# Patient Record
Sex: FEMALE | Race: WHITE | URBAN - METROPOLITAN AREA
[De-identification: names, ages, dates, MRNs, and addresses within clinical notes are randomized per-mention and may not be internally consistent; named-entity substitution may affect disease eponyms.]

---

## 2018-03-24 ENCOUNTER — HOSPITAL ENCOUNTER (INPATIENT)
Facility: CLINIC | Age: 63
LOS: 13 days | Discharge: ANOTHER HOSPITAL | End: 2018-04-06
Attending: PSYCHIATRY & NEUROLOGY | Admitting: PSYCHIATRY & NEUROLOGY
Payer: COMMERCIAL

## 2018-03-24 DIAGNOSIS — F33.3 SEVERE RECURRENT MAJOR DEPRESSION WITH PSYCHOTIC FEATURES (H): Primary | ICD-10-CM

## 2018-03-24 DIAGNOSIS — E55.9 VITAMIN D DEFICIENCY: ICD-10-CM
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
## 2018-03-24 LAB
BASOPHILS # BLD AUTO: 0 10E9/L (ref 0–0.2)
BASOPHILS NFR BLD AUTO: 0.2 %
DIFFERENTIAL METHOD BLD: ABNORMAL
EOSINOPHIL # BLD AUTO: 0 10E9/L (ref 0–0.7)
EOSINOPHIL NFR BLD AUTO: 0.1 %
ERYTHROCYTE [DISTWIDTH] IN BLOOD BY AUTOMATED COUNT: 14.5 % (ref 10–15)
HCT VFR BLD AUTO: 36.8 % (ref 35–47)
HGB BLD-MCNC: 11.7 G/DL (ref 11.7–15.7)
IMM GRANULOCYTES # BLD: 0.1 10E9/L (ref 0–0.4)
IMM GRANULOCYTES NFR BLD: 0.6 %
LYMPHOCYTES # BLD AUTO: 1 10E9/L (ref 0.8–5.3)
LYMPHOCYTES NFR BLD AUTO: 10.8 %
MCH RBC QN AUTO: 31.9 PG (ref 26.5–33)
MCHC RBC AUTO-ENTMCNC: 31.8 G/DL (ref 31.5–36.5)
MCV RBC AUTO: 100 FL (ref 78–100)
MONOCYTES # BLD AUTO: 0.4 10E9/L (ref 0–1.3)
MONOCYTES NFR BLD AUTO: 4 %
NEUTROPHILS # BLD AUTO: 7.6 10E9/L (ref 1.6–8.3)
NEUTROPHILS NFR BLD AUTO: 84.3 %
NRBC # BLD AUTO: 0 10*3/UL
NRBC BLD AUTO-RTO: 0 /100
PLATELET # BLD AUTO: 278 10E9/L (ref 150–450)
RBC # BLD AUTO: 3.67 10E12/L (ref 3.8–5.2)
WBC # BLD AUTO: 9 10E9/L (ref 4–11)

## 2018-03-24 PROCEDURE — 36415 COLL VENOUS BLD VENIPUNCTURE: CPT | Performed by: PSYCHIATRY & NEUROLOGY

## 2018-03-24 PROCEDURE — 25000132 ZZH RX MED GY IP 250 OP 250 PS 637: Performed by: PSYCHIATRY & NEUROLOGY

## 2018-03-24 PROCEDURE — 85025 COMPLETE CBC W/AUTO DIFF WBC: CPT | Performed by: PSYCHIATRY & NEUROLOGY

## 2018-03-24 PROCEDURE — 12400007 ZZH R&B MH INTERMEDIATE UMMC

## 2018-03-24 RX ORDER — GABAPENTIN 300 MG/1
300 CAPSULE ORAL 2 TIMES DAILY
Status: DISCONTINUED | OUTPATIENT
Start: 2018-03-24 | End: 2018-04-06 | Stop reason: HOSPADM

## 2018-03-24 RX ORDER — CLOZAPINE 25 MG/1
25 TABLET ORAL AT BEDTIME
Status: DISCONTINUED | OUTPATIENT
Start: 2018-03-24 | End: 2018-03-26

## 2018-03-24 RX ORDER — TRAZODONE HYDROCHLORIDE 100 MG/1
100 TABLET ORAL AT BEDTIME
Status: DISCONTINUED | OUTPATIENT
Start: 2018-03-24 | End: 2018-04-06 | Stop reason: HOSPADM

## 2018-03-24 RX ADMIN — GABAPENTIN 300 MG: 300 CAPSULE ORAL at 20:21

## 2018-03-24 RX ADMIN — TRAZODONE HYDROCHLORIDE 100 MG: 100 TABLET ORAL at 20:21

## 2018-03-24 RX ADMIN — CLOZAPINE 25 MG: 25 TABLET ORAL at 20:21

## 2018-03-24 ASSESSMENT — ACTIVITIES OF DAILY LIVING (ADL)
GROOMING: INDEPENDENT
WHICH_OF_THE_ABOVE_FUNCTIONAL_RISKS_HAD_A_RECENT_ONSET_OR_CHANGE?: COGNITION
ORAL_HYGIENE: INDEPENDENT
RETIRED_EATING: 0-->INDEPENDENT
COGNITION: 1 - ATTENTION OR MEMORY DEFICITS
DRESS: INDEPENDENT;SCRUBS (BEHAVIORAL HEALTH)
RETIRED_COMMUNICATION: 2-->DIFFICULTY UNDERSTANDING (NOT RELATED TO LANGUAGE BARRIER)
ORAL_HYGIENE: INDEPENDENT
DRESS: INDEPENDENT
GROOMING: INDEPENDENT
DRESS: 0-->INDEPENDENT
LAUNDRY: UNABLE TO COMPLETE
BATHING: 0-->INDEPENDENT
TOILETING: 0-->INDEPENDENT
SWALLOWING: 0-->SWALLOWS FOODS/LIQUIDS WITHOUT DIFFICULTY
FALL_HISTORY_WITHIN_LAST_SIX_MONTHS: NO
AMBULATION: 0-->INDEPENDENT
TRANSFERRING: 0-->INDEPENDENT

## 2018-03-24 NOTE — PROGRESS NOTES
03/24/18 1139   Patient Belongings   Did you bring any home meds/supplements to the hospital?  No   Patient Belongings money (see comment);clothing;glasses;shoes;purse   Disposition of Belongings Kept with patient;Locker;Sent to security per site process   Belongings Search Yes   Clothing Search Yes   Second Staff Danna SANTAMARIA     Kept in pt locker: black coat, glasses, black flat shoes, black socks, black sweat pants with strings, white bra, blue t-shirt, 1 pair of underwear, black purse with misc receipts and papers, plastic bag filled with coins    Sent to security: state ID, insurance and social security card      A               Admission:  I am responsible for any personal items that are not sent to the safe or pharmacy.  [facility] is not responsible for loss, theft or damage of any property in my possession.    Signature:  _________________________________ Date: _______  Time: _____                                              Staff Signature:  ____________________________ Date: ________  Time: _____      2nd Staff person, if patient is unable/unwilling to sign:    Signature: ________________________________ Date: ________  Time: _____     Discharge:  [facility] has returned all of my personal belongings:    Signature: _________________________________ Date: ________  Time: _____                                          Staff Signature:  ____________________________ Date: ________  Time: _____

## 2018-03-24 NOTE — PLAN OF CARE
Problem: Suicidal Behavior (Adult)  Goal: Suicidal Behavior is Absent/Minimized/Managed  Pt will be absent for suicidal thoughts within 48 hours  Pt will be absent of suicidal gestures within 48 hours  Pt will be absent of auditory hallucinations by discharge  Pt will report an increase in mood  Pt will attend minimum of 3 groups daily by discharge  Pt will be compliant with medications within 48 hours.   Pt will be cooperative with consuming an adequate diet within 48 hours.   Pt admitted on 72 hour hold from [facility] after taking an overdose of 30 tablets of trazodone 100 mg in attempt to kill herself. She reports that she is having command hallucinations telling her ways to kill herself. She states that the AH and SI is constant and will continue to try to kill herself. She has a plan to suffocate herself. She states she cannot control the urges and cannot remain safe on unit. She states she wants to leave so she can try again. She does not have hope for the future. She states she had attempted at least 2x in last month. Hx of  IP sta 2014. She reports she has been unable to sleep.     Pt was discharged from [facility] after 72 hour hold  and she overdosed the next day. She appears blunted, fearful, vunerable, depressed, hopeless. Her concentration is distractible and obvious memory impairment. She states she is confused and having difficulty understanding questions. She is pleasant and cooperative.     She lives with her brother and caregiver, RajatJOYCELYN signed.     SIO within 5 feet at all times with suicide and elopement precautions. Pt states she has attempt to leave a facility in the past and does not want to be IP, because it prevents her from attempting suicide again.

## 2018-03-25 LAB
DEPRECATED CALCIDIOL+CALCIFEROL SERPL-MC: 30 UG/L (ref 20–75)
FOLATE SERPL-MCNC: 10.7 NG/ML
GLUCOSE BLDC GLUCOMTR-MCNC: 103 MG/DL (ref 70–99)
VIT B12 SERPL-MCNC: 669 PG/ML (ref 193–986)

## 2018-03-25 PROCEDURE — 82607 VITAMIN B-12: CPT | Performed by: PSYCHIATRY & NEUROLOGY

## 2018-03-25 PROCEDURE — 12400007 ZZH R&B MH INTERMEDIATE UMMC

## 2018-03-25 PROCEDURE — 82306 VITAMIN D 25 HYDROXY: CPT | Performed by: PSYCHIATRY & NEUROLOGY

## 2018-03-25 PROCEDURE — 25000132 ZZH RX MED GY IP 250 OP 250 PS 637: Performed by: PSYCHIATRY & NEUROLOGY

## 2018-03-25 PROCEDURE — 36415 COLL VENOUS BLD VENIPUNCTURE: CPT | Performed by: PSYCHIATRY & NEUROLOGY

## 2018-03-25 PROCEDURE — 99207 ZZC CDG-UP CODE -MED NECESSITY: CPT | Performed by: PHYSICIAN ASSISTANT

## 2018-03-25 PROCEDURE — 82746 ASSAY OF FOLIC ACID SERUM: CPT | Performed by: PSYCHIATRY & NEUROLOGY

## 2018-03-25 PROCEDURE — 00000146 ZZHCL STATISTIC GLUCOSE BY METER IP

## 2018-03-25 RX ORDER — HYDROXYZINE HYDROCHLORIDE 25 MG/1
50 TABLET, FILM COATED ORAL EVERY 6 HOURS PRN
Status: DISCONTINUED | OUTPATIENT
Start: 2018-03-25 | End: 2018-04-06 | Stop reason: HOSPADM

## 2018-03-25 RX ORDER — ALUMINA, MAGNESIA, AND SIMETHICONE 2400; 2400; 240 MG/30ML; MG/30ML; MG/30ML
30 SUSPENSION ORAL EVERY 4 HOURS PRN
Status: DISCONTINUED | OUTPATIENT
Start: 2018-03-25 | End: 2018-04-06 | Stop reason: HOSPADM

## 2018-03-25 RX ORDER — ACETAMINOPHEN 325 MG/1
650 TABLET ORAL EVERY 4 HOURS PRN
Status: DISCONTINUED | OUTPATIENT
Start: 2018-03-25 | End: 2018-04-06 | Stop reason: HOSPADM

## 2018-03-25 RX ORDER — BISACODYL 10 MG
10 SUPPOSITORY, RECTAL RECTAL DAILY PRN
Status: DISCONTINUED | OUTPATIENT
Start: 2018-03-25 | End: 2018-04-06 | Stop reason: HOSPADM

## 2018-03-25 RX ORDER — TRAZODONE HYDROCHLORIDE 50 MG/1
50 TABLET, FILM COATED ORAL
Status: DISCONTINUED | OUTPATIENT
Start: 2018-03-25 | End: 2018-04-06 | Stop reason: HOSPADM

## 2018-03-25 RX ORDER — LITHIUM CARBONATE 300 MG/1
300 TABLET, FILM COATED, EXTENDED RELEASE ORAL DAILY
Status: DISCONTINUED | OUTPATIENT
Start: 2018-03-25 | End: 2018-03-28

## 2018-03-25 RX ADMIN — CLOZAPINE 25 MG: 25 TABLET ORAL at 20:24

## 2018-03-25 RX ADMIN — GABAPENTIN 300 MG: 300 CAPSULE ORAL at 08:39

## 2018-03-25 RX ADMIN — GABAPENTIN 300 MG: 300 CAPSULE ORAL at 20:24

## 2018-03-25 RX ADMIN — LITHIUM CARBONATE 300 MG: 300 TABLET, EXTENDED RELEASE ORAL at 14:15

## 2018-03-25 RX ADMIN — TRAZODONE HYDROCHLORIDE 100 MG: 100 TABLET ORAL at 20:24

## 2018-03-25 ASSESSMENT — ACTIVITIES OF DAILY LIVING (ADL)
GROOMING: INDEPENDENT
ORAL_HYGIENE: INDEPENDENT
DRESS: INDEPENDENT;SCRUBS (BEHAVIORAL HEALTH)
ORAL_HYGIENE: INDEPENDENT
LAUNDRY: UNABLE TO COMPLETE
GROOMING: INDEPENDENT
DRESS: INDEPENDENT

## 2018-03-25 NOTE — PROGRESS NOTES
Initial Psychosocial Assessment    I have reviewed the chart, met with the patient, and developed Care Plan.  Information for assessment was obtained from: Patient and Medical Chart    Presenting Problem:  Patient was admitted on a 72 hour hold to [facility] from [facility] on 3/24/18 due to Command Audio hallucinations telling her to kill herself. She had taken a bottle of trazodone in an overdose attempt in order to die prior to going to [place].       History of Mental Health and Chemical Dependency:  Multiple past Hospitalizations (4-5).  Known to this facility.  Was here in 2014 and prescribed lithium but stopped because she didn't like blood draws.  Hx of several different medication trials.  Hx of SA. Hx of commitment ([facility] )    Substance use does not appear to be contributing to current presentation.      Family Description ([facility], Family Psychiatric History):  Patient was born/raised in [place].  Patient has 1 brother aged 64, and mother is . Father  of prostate cancer.  Patient  is never , no children.  Patient denies any any family history of mental illness or substance abuse/dependence.    Significant Life Events (Illness, Abuse, Trauma, Death):  Mother and brother cared for the patient nearly 20 years. Mother is now .  In 2014, mother and brother were caregivers and reported to [facility] feeling that her SI was a test by God and that they were attempting to heal her through prayer. They feel that now the devil is in control of her.     Living Situation:  She is her own legal decision maker, however, she lives with her brother (Óscar Huntley [phone]) in [facility]) - he basically takes care of and makes decisions for her.     Educational Background:   Patient is a [email or web address] graduate.  She completed 2 years of college at [employer] in [place].     Occupational History:   Patient is unemployed.    Financial Status:  Insurance is provided by Medicaid and  [employer] [place]    Legal Issues:  72 Hour Hold Begin Date: 3/24/2018    72 Hour Hold Begin Time: 9:33 AM    72 Hour Hold End Date: 3/29/2018    72 Hour Hold Time End: 12:10 AM      Ethnic/Cultural Issues:  62 year old female, single, no children    Spiritual Orientation:      Patient was raised Christianity but family left the Mor[employer].  Patient has reported that she worships with her brother at home.        Service History:   N/A     Social Functioning (organization, interests):  Patient is very isolated. She reports that she  has left her home rarely for many years    Current Treatment Providers are:  None reported    Social Service Assessment/Plan:  Patient will have psychiatric assessment and medication management by the psychiatrist. Medications will be reviewed and adjusted per MD as indicated. The treatment team will continue to assess and stabilize the patient's mental health symptoms with the use of medications and therapeutic programming. Hospital staff will provide a safe environment and a therapeutic milieu. Staff will continue to assess patient as needed. Patient will participate in unit groups and activities. Patient will receive individual and group support on the unit.     CTC will do individual inpatient treatment planning and after care planning. CTC will discuss options for increasing community supports with the patient. CTC will coordinate with outpatient providers and will place referrals to ensure appropriate follow up care is in place.

## 2018-03-25 NOTE — PROGRESS NOTES
Patient seen, chart reviewed, care discussed with staff.  Blood pressure 139/78, pulse 99, temperature 98.7  F (37.1  C), temperature source Tympanic, resp. rate 16, weight 64.5 kg (142 lb 1.6 oz), SpO2 94 %.    Alert.  Affect fair.  Speech slow with latency.  Eye contact good.  Psychomotor behavior slow.    Associations intact.  Still wanting to suicide and die    Plan:Clozaril start tonite  2.  Resume [place]      Current Facility-Administered Medications:      traZODone (DESYREL) tablet 50 mg, 50 mg, Oral, At Bedtime PRN, Demarco Ventura MD     magnesium hydroxide (MILK OF MAGNESIA) suspension 30 mL, 30 mL, Oral, Daily PRN, Demarco Ventura MD     bisacodyl (DULCOLAX) Suppository 10 mg, 10 mg, Rectal, Daily PRN, Demarco Ventura MD     hydrOXYzine (ATARAX) tablet 50 mg, 50 mg, Oral, Q6H PRN, Demarco Ventura MD     alum & mag hydroxide-simethicone (MYLANTA ES/MAALOX  ES) suspension 30 mL, 30 mL, Oral, Q4H PRN, Demarco Ventura MD     acetaminophen (TYLENOL) tablet 650 mg, 650 mg, Oral, Q4H PRN, Demarco Ventura MD     lithium (ESKALITH/LITHOBID) CR tablet 300 mg, 300 mg, Oral, Daily, Demarco Ventura MD     gabapentin (NEURONTIN) capsule 300 mg, 300 mg, Oral, BID, Demarco Ventura MD, 300 mg at 03/25/18 0839     traZODone (DESYREL) tablet 100 mg, 100 mg, Oral, At Bedtime, Demacro Ventura MD, 100 mg at 03/24/18 2021     cloZAPine (CLOZARIL) tablet 25 mg, 25 mg, Oral, At Bedtime, Demarco Ventura MD, 25 mg at 03/24/18 2021

## 2018-03-25 NOTE — PHARMACY
Pharmacy Clozapine Initial Note    Date of Service: 3/25/2018  Patient's : 1955  62 year old, female    Recent ANC Value(s):  Recent Labs   Lab Test  18   1339   ANEU  7.6       Is the patient enrolled in the clozapine REMS program? Yes  Ordering prescriber: KAT Ndiaye  Is this provider certified in the clozapine REMS program? Yes  Is the ANC within recommended limits? Yes  Does the patient have any signs or symptoms of infection, including fever or sore throat? Unknown    Plan:  1. Initiate clozapine therapy at 25 mg PO.  2. A WBC with differential will be ordered at least weekly.  ANC values will be entered into the REMS program.  3. Signs/symptoms of infection will be monitored daily.    Alexandrea Turner [facility]  Phone / Pager: [phone]

## 2018-03-25 NOTE — PROGRESS NOTES
Refused dinner but with encouragement did drink 240 vanilla Boost. Isolative to her bed. Affect flat, blunted, depressed. States she has been depressed all her life. States she has had ECT in the past without success. Has suicide ideation to stop eating or suffocate herself.  Was concerned that she had not voided since this afternoon. Bladder scan done at 2000 = 0. Has had little fluid intake. With encouragement did drink 200 cc with hs meds.

## 2018-03-25 NOTE — PROGRESS NOTES
"   03/25/18 1417   Behavioral Health   Hallucinations auditory   Thinking distractable;poor concentration   Orientation person: oriented;place: oriented   Memory baseline memory   Insight poor   Judgement impaired   Eye Contact at examiner   Affect sad;tense;blunted, flat   Mood anxious;depressed;mood is calm   Physical Appearance/Attire attire appropriate to age and situation   Hygiene well groomed  (pt showered on shift)   Suicidality thoughts only   1. Wish to be Dead Yes   2. Non-Specific Active Suicidal Thoughts  Yes   Elopement Statements about wanting to leave   Activity withdrawn;isolative   Speech coherent  (slow to respond)   Medication Sensitivity no stated side effects;no observed side effects   Psychomotor / Gait slow;steady   Activities of Daily Living   Hygiene/Grooming independent   Oral Hygiene independent   Dress independent   Laundry unable to complete   Room Organization independent   Behavioral Health Interventions   Depression maintain safety precautions;maintain safe secure environment;assist patient in developing safety plan;assist patient in following safety plan;encourage participation / independence with adls;encourage nutrition and hydration;provide emotional support;establish therapeutic relationship;assist with developing and utilizing healthy coping strategies;build upon strengths   Social and Therapeutic Interventions (Depression) encourage socialization with peers;encourage effective boundaries with peers;encourage participation in therapeutic groups and milieu activities     Pt states she has active suicidal thoughts and wishing she were dead, but does not have an active plan while in the hospital. She reports that she has auditory hallucinations that have been telling her to kill herself. She has been isolative and withdrawn during majority of the day shift. She did not sleep last night and has been unable to sleep during day, so she is hopeful \"that I will get good sleep tonight.\" " "Pt took a shower on day shift, which was one of her goals for the day. She rates her anxiety at a 7/10 and her depression also at a 7/10. She is \"hopeful that I will sleep tonight. My mood is a lot better today, I am more hopeful.\" Pt has been very pleasant and cooperative.    "

## 2018-03-25 NOTE — CONSULTS
Internal Medicine Initial Visit    Elma Huntley MRN# [id number]   Age: 62 year old YOB: 1955   Date of Admission: 3/24/2018    ADMIT DATE: 3/24/2018  DATE OF CONSULT: 3/25/2018    PCP: No primary care provider on file.    REQUESTING SERVICE: Psychiatry  REASON FOR CONSULT: Depression, SI    CHIEF COMPLAINT: Depression and suicidal ideation    HPI: Elma Huntley is a 62 year old female with a past medical history of schizophrenia, anxiety, chronic suicidal ideation who is admitted to station 3B for suicidal ideation.    The patient notes that she is still depressed with feelings of suicidal ideation. The patient notes that she has no medical issues. She notes she feels okay w/ exception to depression. Lives with her brother who is supportive. She denies any pain. Notes she is drinking water and has no urinary issues currently.     ROS:   10 point ROS asked and otherwise negative, with exceptions as noted above in HPI.     PMH:  No past medical history on file.    PSH:  No past surgical history on file.    MEDICATIONS    No current facility-administered medications on file prior to encounter.   No current outpatient prescriptions on file prior to encounter.    ALLERGIES:   No Known Allergies    FAMILY HISTORY:  Reviewed and updated in [facility].     SOCIAL HISTORY:  Social History     Social History     Marital status: Single     Spouse name: N/A     Number of children: N/A     Years of education: N/A     Social History Main Topics     Smoking status: Not on file     Smokeless tobacco: Not on file     Alcohol use Not on file     Drug use: Not on file     Sexual activity: Not on file     Other Topics Concern     Not on file     Social History Narrative       PHYSICAL EXAM:  Blood pressure 136/77, pulse 113, temperature 98  F (36.7  C), temperature source Tympanic, resp. rate 16, SpO2 94 %.    GENERAL: Alert and orientated x 3. Appears in no acute distress.   HEENT: Anicteric sclera. Mucous membranes moist and  without lesions.   CV: RRR, S1S2, no murmurs appreciated.  RESPIRATORY: Respirations regular, even, and unlabored. Lungs CTAB with no wheezing, rales, rhonchi.   GI: Soft and non distended with normoactive bowel sounds present in all quadrants. No tenderness, rebound, guarding.   EXTREMITIES: No peripheral edema.   NEUROLOGIC: CN II-XII grossly intact. No focal deficits.   MUSCULOSKELETAL: No joint swelling or tenderness.   SKIN: No jaundice. No rashes or lesions.     LABS:  CMPNo lab results found in last 7 days.  CBC  Recent Labs  Lab 03/24/18  1339   WBC 9.0   RBC 3.67*   HGB 11.7   HCT 36.8      MCH 31.9   MCHC 31.8   RDW 14.5        INRNo lab results found in last 7 days.    IMAGING: None to review from this admission    ASSESSMENT & RECOMMENDATIONS:  #Depression, SI, schizophrenia: Longstanding h/o depression w/ SI. CBC WNL. VS w/ normal BP, mildly elevated HR, afebrile, RR normal, O2 saturations 94% on arrival. CMP from OSH 03/17/2018 normal w/ exception to elevated BG. MRI from OSH 03/2018 w/ no focal abnormality.   -Management per psychiatry  #Elevated BG: No h/o diabetes.  at OSH on 03/17 CMP.   -Monitor BG  -Notify medicine if BG >180 persistently    I appreciate the opportunity to participate in the care of this patient. Medicine will sign off, please notify on call DOUGLAS if any intercurrent medical issues arise.     Tamika Winters PA-C

## 2018-03-26 LAB
GLUCOSE BLDC GLUCOMTR-MCNC: 119 MG/DL (ref 70–99)
GLUCOSE BLDC GLUCOMTR-MCNC: 91 MG/DL (ref 70–99)

## 2018-03-26 PROCEDURE — 25000132 ZZH RX MED GY IP 250 OP 250 PS 637: Performed by: PSYCHIATRY & NEUROLOGY

## 2018-03-26 PROCEDURE — 00000146 ZZHCL STATISTIC GLUCOSE BY METER IP

## 2018-03-26 PROCEDURE — 12400005 ZZH R&B MH CRITICAL SENIOR/ADOLESCENT

## 2018-03-26 RX ORDER — CLOZAPINE 25 MG/1
50 TABLET ORAL AT BEDTIME
Status: DISCONTINUED | OUTPATIENT
Start: 2018-03-26 | End: 2018-03-28

## 2018-03-26 RX ADMIN — VITAMIN D, TAB 1000IU (100/BT) 2000 UNITS: 25 TAB at 16:18

## 2018-03-26 RX ADMIN — CLOZAPINE 50 MG: 25 TABLET ORAL at 19:38

## 2018-03-26 RX ADMIN — LITHIUM CARBONATE 300 MG: 300 TABLET, EXTENDED RELEASE ORAL at 08:23

## 2018-03-26 RX ADMIN — GABAPENTIN 300 MG: 300 CAPSULE ORAL at 19:39

## 2018-03-26 RX ADMIN — HYDROXYZINE HYDROCHLORIDE 50 MG: 25 TABLET ORAL at 11:02

## 2018-03-26 RX ADMIN — TRAZODONE HYDROCHLORIDE 100 MG: 100 TABLET ORAL at 19:39

## 2018-03-26 RX ADMIN — GABAPENTIN 300 MG: 300 CAPSULE ORAL at 08:23

## 2018-03-26 ASSESSMENT — ACTIVITIES OF DAILY LIVING (ADL)
ORAL_HYGIENE: INDEPENDENT
GROOMING: INDEPENDENT
ORAL_HYGIENE: INDEPENDENT
DRESS: INDEPENDENT
LAUNDRY: UNABLE TO COMPLETE
GROOMING: INDEPENDENT
DRESS: SCRUBS (BEHAVIORAL HEALTH)

## 2018-03-26 NOTE — PROGRESS NOTES
"Pt was vague with writer about her symptoms.  Does report command hallucinations.  Unable to process ideas about distraction and coping skills.    Accepted PRN Hydroxyzine for anxiety.  Reports \"a little bit better, but still hearing voices.\"    "

## 2018-03-26 NOTE — PROGRESS NOTES
"   03/26/18 1230   Behavioral Health   Hallucinations auditory   Thinking poor concentration   Orientation person: oriented;place: oriented;date: oriented;time: oriented   Memory baseline memory   Insight admits / accepts   Judgement intact   Eye Contact at examiner   Affect blunted, flat;other (see comments)  (at times full range)   Mood mood is calm;depressed;hopeless   Physical Appearance/Attire attire appropriate to age and situation   Hygiene well groomed   Suicidality thoughts only;other (see comments)  (\"the voices are telling me i should kill myself\")   1. Wish to be Dead Yes   2. Non-Specific Active Suicidal Thoughts  Yes  (\"the voices are telling me I should kill myself:)   Self Injury other (see comment)  (denies)   Elopement (none noted this shift)   Activity withdrawn;isolative;other (see comment)  (out for meals in milieu)   Speech clear;coherent   Medication Sensitivity no stated side effects;no observed side effects   Psychomotor / Gait balanced;steady   Safety   Suicidality SIO (Status Individual Observation)  (NOTE - order will specify distance;Minimal furniture in room;Minimal personal belongings in room;Room door open;Unpredictable frequency of checking on patient;Promote patient engagement with treatment process;Identify and strengthen protective factors   Coping/Psychosocial   Verbalized Emotional State hopelessness;depression   Plan Of Care Reviewed With patient   Patient Agreement with Plan of Care agrees   Psycho Education   Type of Intervention 1:1 intervention   Response participates, initiates socially appropriate   Hours 0.5   Treatment Detail (check in)   Group Therapy Session   Group Attendance refused to attend group session   Activities of Daily Living   Hygiene/Grooming independent   Oral Hygiene independent   Dress scrubs (behavioral health)   Laundry unable to complete   Room Organization independent   Activity   Activity Assistance Provided independent   Behavioral Health " "Interventions   Depression maintain safety precautions;maintain safe secure environment;encourage nutrition and hydration;encourage participation / independence with adls;provide emotional support;assist with developing and utilizing healthy coping strategies;establish therapeutic relationship;build upon strengths;monitor need for prn medication;monitor confusion, memory loss, decision making ability and reorient / intervene as needed   Social and Therapeutic Interventions (Depression) encourage socialization with peers;encourage effective boundaries with peers;encourage participation in therapeutic groups and milieu activities     Pt was in room working on word search and pacing most of the shift stating that \"they help the voices sometimes.\" Pt encouraged to attend groups this shift, but pt stated \"the voices are bad today. I am having a really bad day.\" Pt endorses AH that \"tell me I am worthless and that I should kill myself.\" Pt engages in conversation with staff and affect brightens some. Pt affect blunted/flat with calm and depressed mood. Pt out for both meals and eating well. Pt stated getting \"the best sleep I have had in 2 or 3 nights\" last night despite waking \"up early, and couldn't get back to sleep.\" Pt endorses SI, but not SIB. Pt calm, cooperative, and pleasant with check in.  "

## 2018-03-26 NOTE — PROGRESS NOTES
Patient seen, chart reviewed, care discussed with staff.  Blood pressure 126/58, pulse 101, temperature 98.7  F (37.1  C), temperature source Oral, resp. rate 16, weight 64.5 kg (142 lb 1.6 oz), SpO2 94 %.    Alert.  Affect fair, sad.  Speech latency noted.  Eye contact good.  Psychomotor behavior and gait  slow.  Hallucinations present today, less so yesterday  No dizzyness.  Associations intact. Cognitions intact.  Still needs 1:1 for safety.    Plan: increase Clozaril  Lithium level      Current Facility-Administered Medications:      cloZAPine (CLOZARIL) tablet 50 mg, 50 mg, Oral, At Bedtime, Demarco Ventura MD     cholecalciferol (vitamin D3) tablet 2,000 Units, 2,000 Units, Oral, Daily, Demarco Ventura MD     traZODone (DESYREL) tablet 50 mg, 50 mg, Oral, At Bedtime PRN, Demarco Ventura MD     magnesium hydroxide (MILK OF MAGNESIA) suspension 30 mL, 30 mL, Oral, Daily PRN, Demarco Ventura MD     bisacodyl (DULCOLAX) Suppository 10 mg, 10 mg, Rectal, Daily PRN, Demarco Ventura MD     hydrOXYzine (ATARAX) tablet 50 mg, 50 mg, Oral, Q6H PRN, Demarco Ventura MD, 50 mg at 03/26/18 1102     alum & mag hydroxide-simethicone (MYLANTA ES/MAALOX  ES) suspension 30 mL, 30 mL, Oral, Q4H PRN, Demarco Ventura MD     acetaminophen (TYLENOL) tablet 650 mg, 650 mg, Oral, Q4H PRN, Demarco Ventura MD     lithium (ESKALITH/LITHOBID) CR tablet 300 mg, 300 mg, Oral, Daily, Demarco Ventura MD, 300 mg at 03/26/18 0823     gabapentin (NEURONTIN) capsule 300 mg, 300 mg, Oral, BID, Demarco Ventura MD, 300 mg at 03/26/18 0823     traZODone (DESYREL) tablet 100 mg, 100 mg, Oral, At Bedtime, Demarco Ventura MD, 100 mg at 03/25/18 2024  Recent Results (from the past 168 hour(s))   CBC with platelets differential    Collection Time: 03/24/18  1:39 PM   Result Value Ref Range    WBC 9.0 4.0 - 11.0 10e9/L    RBC Count 3.67 (L) 3.8 - 5.2 10e12/L    Hemoglobin 11.7 11.7 - 15.7 g/dL    Hematocrit 36.8 35.0 - 47.0 %     78 - 100 fl    MCH 31.9 26.5 -  33.0 pg    MCHC 31.8 31.5 - 36.5 g/dL    RDW 14.5 10.0 - 15.0 %    Platelet Count 278 150 - 450 10e9/L    Diff Method Automated Method     % Neutrophils 84.3 %    % Lymphocytes 10.8 %    % Monocytes 4.0 %    % Eosinophils 0.1 %    % Basophils 0.2 %    % Immature Granulocytes 0.6 %    Nucleated RBCs 0 0 /100    Absolute Neutrophil 7.6 1.6 - 8.3 10e9/L    Absolute Lymphocytes 1.0 0.8 - 5.3 10e9/L    Absolute Monocytes 0.4 0.0 - 1.3 10e9/L    Absolute Eosinophils 0.0 0.0 - 0.7 10e9/L    Absolute Basophils 0.0 0.0 - 0.2 10e9/L    Abs Immature Granulocytes 0.1 0 - 0.4 10e9/L    Absolute Nucleated RBC 0.0    Vitamin B12    Collection Time: 03/25/18  7:30 AM   Result Value Ref Range    Vitamin B12 669 193 - 986 pg/mL   Folate    Collection Time: 03/25/18  7:30 AM   Result Value Ref Range    Folate 10.7 >5.4 ng/mL   Vitamin D    Collection Time: 03/25/18  7:30 AM   Result Value Ref Range    Vitamin D Deficiency screening 30 20 - 75 ug/L   Glucose by meter    Collection Time: 03/25/18  4:57 PM   Result Value Ref Range    Glucose 103 (H) 70 - 99 mg/dL   Glucose by meter    Collection Time: 03/26/18  7:46 AM   Result Value Ref Range    Glucose 119 (H) 70 - 99 mg/dL

## 2018-03-26 NOTE — PLAN OF CARE
Problem: Patient Care Overview  Goal: Team Discussion  Team Plan:   BEHAVIORAL TEAM DISCUSSION    Participants: Dr Lorenzo MD, Kathleen,RN, Mary Mckee OT and Loida Garcia [facility]  Progress: Initial  Continued Stay Criteria/Rationale: Psychosis  Medical/Physical: Refer to medical  Precautions:   Behavioral Orders   Procedures     Code 1 - Restrict to Unit     Elopement precautions     Routine Programming     As clinically indicated     Status Individual Observation     Order Specific Question:   CONTINUOUS 24 hours / day     Answer:   Distance and Exceptions     Order Specific Question:   Distance     Answer:   5 feet     Order Specific Question:   Exceptions     Answer:   none     Suicide precautions     Patients on Suicide Precautions should have a Combination Diet ordered that includes a Diet selection(s) AND a Behavioral Tray selection for Safe Tray - with utensils, or Safe Tray - NO utensils       Plan: : Psychiatric and Medical Assessment. Medication Management. Therapeutic Milieu. Occupational therapy, mental health education, Individual and group support. Message left for Brother/ caregiver, Rajat to gain collateral. Pt signed in voluntary.  Rationale for change in precautions or plan: NA

## 2018-03-26 NOTE — PROGRESS NOTES
1415: Writer LM for pts brotherRajat [phone] to gain collateral. JOYCELYN in chart.    1450: UPDATE: Rajat called back and gave the following collateral information. Pts Zyprexa was recently decreased and sicne then she has reported voices and being suicidal. At baseline pt does house work, crafts, reads, sleeps about 8-9 hours per night and is not suicidal or talking about voices. Plan will be for pt to discharge back to brother's home once stable. Pt has community MH providers.

## 2018-03-26 NOTE — PLAN OF CARE
Problem: Depressive Symptoms  Goal: Depressive Symptoms  Signs and symptoms of listed problems will be absent or manageable.   Outcome: Improving  Denies auditory command hallucinations to kill herself.. This evening states no longer has plan to kill self ; only fleeting thoughts with no plan. This evening denies wish to be dead. Ate well for dinner. Engaging in 1:1, more animated. Attended and participated in community meeting. Is hopeful.  States she did not sleep well last night. Good eye contact.Plan: Have MD assess tomorrow if she still needs SIO.

## 2018-03-27 LAB
GLUCOSE BLDC GLUCOMTR-MCNC: 112 MG/DL (ref 70–99)
LITHIUM SERPL-SCNC: <0.2 MMOL/L (ref 0.6–1.2)

## 2018-03-27 PROCEDURE — 00000146 ZZHCL STATISTIC GLUCOSE BY METER IP

## 2018-03-27 PROCEDURE — H2032 ACTIVITY THERAPY, PER 15 MIN: HCPCS

## 2018-03-27 PROCEDURE — 36415 COLL VENOUS BLD VENIPUNCTURE: CPT | Performed by: PSYCHIATRY & NEUROLOGY

## 2018-03-27 PROCEDURE — 80178 ASSAY OF LITHIUM: CPT | Performed by: PSYCHIATRY & NEUROLOGY

## 2018-03-27 PROCEDURE — 12400005 ZZH R&B MH CRITICAL SENIOR/ADOLESCENT

## 2018-03-27 PROCEDURE — 97150 GROUP THERAPEUTIC PROCEDURES: CPT | Mod: GO

## 2018-03-27 PROCEDURE — 25000132 ZZH RX MED GY IP 250 OP 250 PS 637: Performed by: PSYCHIATRY & NEUROLOGY

## 2018-03-27 RX ORDER — OLANZAPINE 5 MG/1
10 TABLET, ORALLY DISINTEGRATING ORAL EVERY 6 HOURS PRN
Status: DISCONTINUED | OUTPATIENT
Start: 2018-03-27 | End: 2018-03-29

## 2018-03-27 RX ADMIN — CLOZAPINE 50 MG: 25 TABLET ORAL at 20:24

## 2018-03-27 RX ADMIN — VITAMIN D, TAB 1000IU (100/BT) 2000 UNITS: 25 TAB at 08:08

## 2018-03-27 RX ADMIN — GABAPENTIN 300 MG: 300 CAPSULE ORAL at 20:24

## 2018-03-27 RX ADMIN — GABAPENTIN 300 MG: 300 CAPSULE ORAL at 08:08

## 2018-03-27 RX ADMIN — OLANZAPINE 10 MG: 5 TABLET, ORALLY DISINTEGRATING ORAL at 14:45

## 2018-03-27 RX ADMIN — LITHIUM CARBONATE 300 MG: 300 TABLET, EXTENDED RELEASE ORAL at 08:08

## 2018-03-27 RX ADMIN — TRAZODONE HYDROCHLORIDE 100 MG: 100 TABLET ORAL at 20:24

## 2018-03-27 ASSESSMENT — ACTIVITIES OF DAILY LIVING (ADL)
LAUNDRY: UNABLE TO COMPLETE
GROOMING: INDEPENDENT
DRESS: INDEPENDENT
GROOMING: INDEPENDENT
ORAL_HYGIENE: INDEPENDENT
DRESS: INDEPENDENT
ORAL_HYGIENE: INDEPENDENT

## 2018-03-27 NOTE — PLAN OF CARE
"Problem: Depressive Symptoms  Goal: Depressive Symptoms  Signs and symptoms of listed problems will be absent or manageable.   Absense of SI.  Participate in structures activities.  Eat 50 to 75% of meals.  Sleep 6 to 8 hours at night.     Attended 1 of 2 OT groups. She initiated request to work on putting together a puzzle. Organization was unsuccessful even with cues, though she wanted to continue work on this. At the end of the session she stated this being too difficult and wanting to try a less challenging task for the next day. She was pleasant. Stated reason for admission on the OT goal form as \"suicidal depression\". she chose the goal focus to improve concentration, time management, follow directions better and listen better. Pt was given and completed a written self assessment. OT purpose was explained with the value of having involvement in treatment plan, and provided options to meet self identified goals. Will assess further and set goal focus.        "

## 2018-03-27 NOTE — PROGRESS NOTES
Feeling hopeless. Has auditory hallucination that tell her to kill herself. Has SI but no plan. Med compliant.

## 2018-03-27 NOTE — PLAN OF CARE
"Problem: Suicidal Behavior (Adult)  Goal: Suicidal Behavior is Absent/Minimized/Managed  Pt will be absent for suicidal thoughts within 48 hours  Pt will be absent of suicidal gestures within 48 hours  Pt will be absent of auditory hallucinations by discharge  Pt will report an increase in mood  Pt will attend minimum of 3 groups daily by discharge  Pt will be compliant with medications within 48 hours.   Pt will be cooperative with consuming an adequate diet within 48 hours.    Outcome: No Change  48 Hour Nursing Assessment  Pt states she is having a hard day and that the voices are telling her to kill herself and that it is hopeless. She states she is unable to differentiate the voices as being symptoms of her illness and believes they speak the truth. She describes the voices as constant, even when others are speaking. She states that nothing has changed, but did report she did have suicidal thoughts on Sunday. Her mood is restrictive, blunted, staring, delayed responses. She is unable to give me a mood to describe herself, choosing \"lousy\".   She smiled big upon approach, is pleasant. She states she does not want to go to groups, discussed importance of groups and the use of distraction with the voices. She does state she sees value in ignoring the voices. She appears anxious and pulses are elevated, 104, 117. Encouraging fluids.   She is medication compliant, denies side effects, reports sleep is better, ADLs are appropriate.   She thinks she feels safer with SIO. SIO will need to be continued at this time as pt cannot state she is safe. Will monitor if SIO is creating secondary gains.     Lithium level <0.20 notified Dr Ventura      Increased focus on voices, SI, and hopelessness. Obtained order for zydis 10 mg PO every 6 hours PRN for psychosis, voices. Administered at 1445.   "

## 2018-03-27 NOTE — PROGRESS NOTES
Writer received VM from Sarah Mckee at [facility] regarding pt. [phone]. Sarah kerr she will be provided crisis support to pt upon discharge from hospital. Writer returned call but had to LM.

## 2018-03-28 LAB
GLUCOSE BLDC GLUCOMTR-MCNC: 117 MG/DL (ref 70–99)
GLUCOSE BLDC GLUCOMTR-MCNC: 94 MG/DL (ref 70–99)

## 2018-03-28 PROCEDURE — 25000132 ZZH RX MED GY IP 250 OP 250 PS 637: Performed by: PSYCHIATRY & NEUROLOGY

## 2018-03-28 PROCEDURE — 00000146 ZZHCL STATISTIC GLUCOSE BY METER IP

## 2018-03-28 PROCEDURE — 12400005 ZZH R&B MH CRITICAL SENIOR/ADOLESCENT

## 2018-03-28 PROCEDURE — 97150 GROUP THERAPEUTIC PROCEDURES: CPT | Mod: GO

## 2018-03-28 PROCEDURE — 90853 GROUP PSYCHOTHERAPY: CPT

## 2018-03-28 RX ORDER — LITHIUM CARBONATE 450 MG
450 TABLET, EXTENDED RELEASE ORAL DAILY
Status: DISCONTINUED | OUTPATIENT
Start: 2018-03-29 | End: 2018-04-06 | Stop reason: HOSPADM

## 2018-03-28 RX ORDER — CLOZAPINE 25 MG/1
75 TABLET ORAL AT BEDTIME
Status: DISCONTINUED | OUTPATIENT
Start: 2018-03-28 | End: 2018-03-30

## 2018-03-28 RX ADMIN — TRAZODONE HYDROCHLORIDE 100 MG: 100 TABLET ORAL at 20:28

## 2018-03-28 RX ADMIN — GABAPENTIN 300 MG: 300 CAPSULE ORAL at 08:45

## 2018-03-28 RX ADMIN — CLOZAPINE 75 MG: 25 TABLET ORAL at 20:27

## 2018-03-28 RX ADMIN — OLANZAPINE 10 MG: 5 TABLET, ORALLY DISINTEGRATING ORAL at 05:37

## 2018-03-28 RX ADMIN — GABAPENTIN 300 MG: 300 CAPSULE ORAL at 20:27

## 2018-03-28 RX ADMIN — VITAMIN D, TAB 1000IU (100/BT) 2000 UNITS: 25 TAB at 08:45

## 2018-03-28 RX ADMIN — LITHIUM CARBONATE 300 MG: 300 TABLET, EXTENDED RELEASE ORAL at 08:45

## 2018-03-28 RX ADMIN — OLANZAPINE 10 MG: 5 TABLET, ORALLY DISINTEGRATING ORAL at 13:12

## 2018-03-28 ASSESSMENT — ACTIVITIES OF DAILY LIVING (ADL)
DRESS: SCRUBS (BEHAVIORAL HEALTH)
HYGIENE/GROOMING: INDEPENDENT
ORAL_HYGIENE: INDEPENDENT

## 2018-03-28 NOTE — PROGRESS NOTES
"Pt is pleasant and cooperative, but reports that her day is not going well.  She said that she continues to hear voices and the voices are making her feel \"very suicidal.\"  When asked about if she had any plan due to the SI's, she said \"Maybe I could quit eating while I'm here, and then do something when I get home.\"  She attended groups this morning, but hung out in her room most of the afternoon doing word search puzzles with a sound machine in the background.  She had a PRN med shortly after lunch which she reported was helpful.    "

## 2018-03-28 NOTE — PROGRESS NOTES
"Patient seen, chart reviewed, care discussed with staff.  Blood pressure 126/89, pulse 113, temperature 97.2  F (36.2  C), temperature source Tympanic, resp. rate 16, weight 63.9 kg (140 lb 14.4 oz), SpO2 95 %.    Alert.  Affect fair, has a \"walleyed\" look and stare.  Speech decreased.  Eye contact good.  Psychomotor behavior deliberate and gait  normal.  Voices and psychosis and suicidal thoughts continue.  Associations intact. Cognitions intact superficially.    Plan: increase Clozaril to 75mg, increase Li to 450mg  Li level Friday      Current Facility-Administered Medications:      cloZAPine (CLOZARIL) tablet 75 mg, 75 mg, Oral, At Bedtime, Demarco Ventura MD     [START ON 3/29/2018] lithium (ESKALITH) CR tablet 450 mg, 450 mg, Oral, Daily, Demarco Ventura MD     OLANZapine zydis (zyPREXA) ODT tab 10 mg, 10 mg, Oral, Q6H PRN, Demarco Ventura MD, 10 mg at 03/28/18 0537     cholecalciferol (vitamin D3) tablet 2,000 Units, 2,000 Units, Oral, Daily, Demarco Ventura MD, 2,000 Units at 03/28/18 0845     traZODone (DESYREL) tablet 50 mg, 50 mg, Oral, At Bedtime PRN, Demarco Ventura MD     magnesium hydroxide (MILK OF MAGNESIA) suspension 30 mL, 30 mL, Oral, Daily PRN, Demarco Ventura MD     bisacodyl (DULCOLAX) Suppository 10 mg, 10 mg, Rectal, Daily PRN, Demarco Ventura MD     hydrOXYzine (ATARAX) tablet 50 mg, 50 mg, Oral, Q6H PRN, Demarco Ventura MD, 50 mg at 03/26/18 1102     alum & mag hydroxide-simethicone (MYLANTA ES/MAALOX  ES) suspension 30 mL, 30 mL, Oral, Q4H PRN, Demarco Ventura MD     acetaminophen (TYLENOL) tablet 650 mg, 650 mg, Oral, Q4H PRN, Demarco Ventura MD     gabapentin (NEURONTIN) capsule 300 mg, 300 mg, Oral, BID, Demarco Ventura MD, 300 mg at 03/28/18 0845     traZODone (DESYREL) tablet 100 mg, 100 mg, Oral, At Bedtime, Demarco Ventura MD, 100 mg at 03/27/18 2024      "

## 2018-03-28 NOTE — PROGRESS NOTES
Patient was awake @ 0315, went back to sleep and was awake again @ 0430. Verbalized that she has been hearing voices telling her to kill herself. Zyprexa 10 mg. was offered and given to her @ 0537 PRN for hearing voices. She stayed up the following hour and kept on pacing around her room and out in the hallway and lounge and eventually went back to her room. Verbalized that the voices were minimized a few minutes after.  Slept a total of 6.5 hours.

## 2018-03-28 NOTE — PROGRESS NOTES
"Pt reported that \"voices were very loud\"  \"make me feel hopeless\"  \"think about committing suicide\"    \"I would starve myself- or do something when I get home.\"    Zyprexa 10 mg given.  Reported \"Much calmer\" \"voices quieter\" an hour later.    Reassess need for SIO tomorrow.        "

## 2018-03-28 NOTE — PROGRESS NOTES
03/28/18 1400   General Information   Special Considerations completed on 3-28-18   Clinical Impression   Affect Appropriate to situation;Flat   Orientation Oriented to person, place and time   Appearance and ADLs Neatly groomed   Attention to Internal Stimuli No observed signs   Interaction Skills Interacts appropriately with staff;Interacts appropriately with peers   Ability to Communicate Needs Independent   Verbal Content Prospect;Clear;Appropriate to topic;Needs further assessment   Ability to Maintain Boundaries Maintains appropriate physical boundaries;Maintains appropriate verbal boundaries   Participation Independently participates   Concentration Concentrates 30+ minutes   Ability to Concentrate With structure   Follows and Comprehends Directions Independently follows 2 step verbal directions   Memory Needs further assessment   Organization Needs occasional assistance ;Needs further assessment   Decision Making Follows the leads of peers   Planning and Problem Solving Needs further assessment   Ability to Apply and Learn Concepts Applies within group structure   Frustrations / Stress Tolerance Needs further assessment;Independently identifies skills    Level of Insight Identifies needs with structure/support   Self Esteem Can identify positives   Social Supports Has knowledge of support systems   General Observation/Plan   General Observations/Plan See Comments   Attended 2 of 2 OT groups. She worked on a less complex task during group, as she requested from previous day. She worked at a relaxed pace, which was more successful in problem solving, working quietly and being pleasant on approach. She participated in an activity focused on identifying stressors in one's life. She identified positives about herself, spoke up at her turns and needed no prompting, seeming alert and involved. Will continue assessment. Pt was given and completed a written self assessment. OT purpose was explained with the value  of having involvement in treatment plan, and provided options to meet self identified goals. See goals chosen noted in note on 3-27-18. Plan:  Encourage attendance. Offer opportunity for success oriented, more structured task work, grade complexity as needed for success, provide the opportunity to increase concentration and comfort with attendance and reassurance with task focus. Assess further in the areas of organization and planning and document.

## 2018-03-28 NOTE — PROGRESS NOTES
03/27/18 2038   Behavioral Health   Hallucinations auditory   Thinking distractable   Orientation person: oriented;place: oriented   Memory baseline memory   Insight admits / accepts;insight appropriate to situation;insight appropriate to events   Judgement impaired   Eye Contact at examiner   Affect blunted, flat   Mood anxious   Physical Appearance/Attire appears stated age;attire appropriate to age and situation   Hygiene well groomed   Suicidality thoughts only  (command hallucinations)   1. Wish to be Dead No   2. Non-Specific Active Suicidal Thoughts  Yes   Self Injury other (see comment)  (not verbalized)   Activity isolative;withdrawn   Speech clear;coherent   Psychomotor / Gait slow;balanced;steady   Sleep/Rest/Relaxation   Day/Evening Time Hours napping;resting in bed   Safety   Suicidality SIO (Status Individual Observation)  (NOTE - order will specify distance   Coping/Psychosocial   Verbalized Emotional State anxiety   Psycho Education   Type of Intervention 1:1 intervention   Response participates with encouragement   Hours 0.5   Treatment Detail MH status   Activities of Daily Living   Hygiene/Grooming independent   Oral Hygiene independent   Dress independent   Room Organization independent   Groups   Details none available   Behavioral Health Interventions   Depression maintain safety precautions;maintain safe secure environment;establish therapeutic relationship;assist with developing and utilizing healthy coping strategies;build upon strengths;assess patient response to medication;monitor confusion, memory loss, decision making ability and reorient / intervene as needed   Social and Therapeutic Interventions (Depression) encourage socialization with peers;encourage participation in therapeutic groups and milieu activities     Patient isolated to her room most of the shift, coming out only for dinner and medications.  She had no observed peer interaction but readily responded to staff inquiries.  " She reported ongoing high anxiety subsequent to and associated with intermittent non-specific command hallucinations to kill herself.  She did state that they were \"...a little better this evening.\"She has been trying to distract herself with her Bible reading and word find activity.  She neither reported or exhibited any SIB's tonight.   Beau Saenz   03/27/2018  "

## 2018-03-29 LAB
GLUCOSE BLDC GLUCOMTR-MCNC: 112 MG/DL (ref 70–99)
GLUCOSE BLDC GLUCOMTR-MCNC: 115 MG/DL (ref 70–99)

## 2018-03-29 PROCEDURE — 12400005 ZZH R&B MH CRITICAL SENIOR/ADOLESCENT

## 2018-03-29 PROCEDURE — 90853 GROUP PSYCHOTHERAPY: CPT

## 2018-03-29 PROCEDURE — 97150 GROUP THERAPEUTIC PROCEDURES: CPT | Mod: GO

## 2018-03-29 PROCEDURE — 00000146 ZZHCL STATISTIC GLUCOSE BY METER IP

## 2018-03-29 PROCEDURE — 25000132 ZZH RX MED GY IP 250 OP 250 PS 637: Performed by: PSYCHIATRY & NEUROLOGY

## 2018-03-29 RX ORDER — OLANZAPINE 5 MG/1
5-10 TABLET, ORALLY DISINTEGRATING ORAL EVERY 6 HOURS PRN
Status: DISCONTINUED | OUTPATIENT
Start: 2018-03-29 | End: 2018-03-30

## 2018-03-29 RX ADMIN — OLANZAPINE 5 MG: 5 TABLET, ORALLY DISINTEGRATING ORAL at 18:43

## 2018-03-29 RX ADMIN — GABAPENTIN 300 MG: 300 CAPSULE ORAL at 08:29

## 2018-03-29 RX ADMIN — LITHIUM CARBONATE 450 MG: 450 TABLET, EXTENDED RELEASE ORAL at 08:29

## 2018-03-29 RX ADMIN — VITAMIN D, TAB 1000IU (100/BT) 2000 UNITS: 25 TAB at 08:29

## 2018-03-29 RX ADMIN — CLOZAPINE 75 MG: 25 TABLET ORAL at 20:34

## 2018-03-29 RX ADMIN — GABAPENTIN 300 MG: 300 CAPSULE ORAL at 20:35

## 2018-03-29 RX ADMIN — TRAZODONE HYDROCHLORIDE 100 MG: 100 TABLET ORAL at 20:35

## 2018-03-29 ASSESSMENT — ACTIVITIES OF DAILY LIVING (ADL)
DRESS: INDEPENDENT
LAUNDRY: UNABLE TO COMPLETE
ORAL_HYGIENE: INDEPENDENT
GROOMING: INDEPENDENT

## 2018-03-29 NOTE — PLAN OF CARE
"Problem: Suicidal Behavior (Adult)  Goal: Suicidal Behavior is Absent/Minimized/Managed  Pt will be absent for suicidal thoughts within 48 hours  Pt will be absent of suicidal gestures within 48 hours  Pt will be absent of auditory hallucinations by discharge  Pt will report an increase in mood  Pt will attend minimum of 3 groups daily by discharge  Pt will be compliant with medications within 48 hours.   Pt will be cooperative with consuming an adequate diet within 48 hours.      48 hour assessment  Outcome: Improving    Pt is reporting decreased SI.  \"I am doing a lot better.\"  Hopeful that she will continue to improve and be able to d/c SIO's tomorrow.  Smiles easily.    Pt reported imrovement after Zyprexa given yesterday.  Had only one dose given in afternoon 3/28.    Pt is eating and drinking.  Increasing group attendance.  Ambulates independently.    Pt is aware that she has PRN Olanzapine available TID.        "

## 2018-03-29 NOTE — PROGRESS NOTES
Pt says she feels much better after morning prn zyprexa.  Pt denied SI/SIB and other MH symptoms.  Pt went on to say she may feel a little depressed but is doing much better this evening than she has in a while.  Pt had a good visit with her brother.  Pt was appropriate in milieu and in conversation. Pt spent most of evening in room.       03/28/18 2200   Behavioral Health   Hallucinations denies / not responding to hallucinations   Thinking poor concentration   Orientation person: oriented;place: oriented;date: oriented;time: oriented   Memory baseline memory   Insight admits / accepts   Judgement intact   Eye Contact at examiner   Affect full range affect   Mood mood is calm   Physical Appearance/Attire attire appropriate to age and situation   Hygiene well groomed   Suicidality other (see comments)  (denies)   1. Wish to be Dead No   2. Non-Specific Active Suicidal Thoughts  No   Self Injury (denies)   Elopement (none)   Activity isolative   Speech clear;coherent   Medication Sensitivity no stated side effects;no observed side effects   Psychomotor / Gait balanced;steady   Psycho Education   Type of Intervention 1:1 intervention   Response participates, initiates socially appropriate   Hours 0.5   Treatment Detail check in   Behavioral Health Interventions   Depression maintain safety precautions;maintain safe secure environment;assist patient in following safety plan;encourage nutrition and hydration;encourage participation / independence with adls;provide emotional support;assist with developing and utilizing healthy coping strategies;build upon strengths;assess patient response to medication;monitor need for prn medication   Social and Therapeutic Interventions (Depression) encourage socialization with peers;encourage effective boundaries with peers;encourage participation in therapeutic groups and milieu activities

## 2018-03-29 NOTE — PLAN OF CARE
Problem: Depressive Symptoms  Goal: Depressive Symptoms  Signs and symptoms of listed problems will be absent or manageable.   Absense of SI.  Participate in structures activities.  Eat 50 to 75% of meals.  Sleep 6 to 8 hours at night.     Attended 2 of 2 OT groups. She was quick to attend, participated in all opportunities. She was more successful with a familiar task requiring problem solving. Affect was bright on approach. She learned an activity in the afternoon group requiring problem solving with visuspatial concepts. Needed no cues and was alert to when it was her turn and to details of activity.

## 2018-03-30 LAB
BASOPHILS # BLD AUTO: 0 10E9/L (ref 0–0.2)
BASOPHILS NFR BLD AUTO: 0.2 %
DIFFERENTIAL METHOD BLD: NORMAL
EOSINOPHIL # BLD AUTO: 0.1 10E9/L (ref 0–0.7)
EOSINOPHIL NFR BLD AUTO: 1 %
GLUCOSE BLDC GLUCOMTR-MCNC: 100 MG/DL (ref 70–99)
GLUCOSE BLDC GLUCOMTR-MCNC: 101 MG/DL (ref 70–99)
IMM GRANULOCYTES # BLD: 0 10E9/L (ref 0–0.4)
IMM GRANULOCYTES NFR BLD: 0.4 %
LITHIUM SERPL-SCNC: <0.2 MMOL/L (ref 0.6–1.2)
LYMPHOCYTES # BLD AUTO: 1.4 10E9/L (ref 0.8–5.3)
LYMPHOCYTES NFR BLD AUTO: 13.3 %
MONOCYTES # BLD AUTO: 0.6 10E9/L (ref 0–1.3)
MONOCYTES NFR BLD AUTO: 6.1 %
NEUTROPHILS # BLD AUTO: 8.2 10E9/L (ref 1.6–8.3)
NEUTROPHILS NFR BLD AUTO: 79 %
NRBC # BLD AUTO: 0 10*3/UL
NRBC BLD AUTO-RTO: 0 /100
WBC # BLD AUTO: 10.3 10E9/L (ref 4–11)

## 2018-03-30 PROCEDURE — 36415 COLL VENOUS BLD VENIPUNCTURE: CPT | Performed by: PSYCHIATRY & NEUROLOGY

## 2018-03-30 PROCEDURE — 85048 AUTOMATED LEUKOCYTE COUNT: CPT | Performed by: PSYCHIATRY & NEUROLOGY

## 2018-03-30 PROCEDURE — 12400005 ZZH R&B MH CRITICAL SENIOR/ADOLESCENT

## 2018-03-30 PROCEDURE — 80178 ASSAY OF LITHIUM: CPT | Performed by: PSYCHIATRY & NEUROLOGY

## 2018-03-30 PROCEDURE — 90853 GROUP PSYCHOTHERAPY: CPT

## 2018-03-30 PROCEDURE — 85004 AUTOMATED DIFF WBC COUNT: CPT | Performed by: PSYCHIATRY & NEUROLOGY

## 2018-03-30 PROCEDURE — 00000146 ZZHCL STATISTIC GLUCOSE BY METER IP

## 2018-03-30 PROCEDURE — 97150 GROUP THERAPEUTIC PROCEDURES: CPT | Mod: GO

## 2018-03-30 PROCEDURE — 25000132 ZZH RX MED GY IP 250 OP 250 PS 637: Performed by: PSYCHIATRY & NEUROLOGY

## 2018-03-30 RX ORDER — OLANZAPINE 5 MG/1
10 TABLET, ORALLY DISINTEGRATING ORAL EVERY 6 HOURS PRN
Status: DISCONTINUED | OUTPATIENT
Start: 2018-03-30 | End: 2018-04-06 | Stop reason: HOSPADM

## 2018-03-30 RX ORDER — CLOZAPINE 100 MG/1
100 TABLET ORAL AT BEDTIME
Status: DISCONTINUED | OUTPATIENT
Start: 2018-03-30 | End: 2018-04-04

## 2018-03-30 RX ADMIN — GABAPENTIN 300 MG: 300 CAPSULE ORAL at 08:36

## 2018-03-30 RX ADMIN — GABAPENTIN 300 MG: 300 CAPSULE ORAL at 20:09

## 2018-03-30 RX ADMIN — VITAMIN D, TAB 1000IU (100/BT) 2000 UNITS: 25 TAB at 08:36

## 2018-03-30 RX ADMIN — LITHIUM CARBONATE 450 MG: 450 TABLET, EXTENDED RELEASE ORAL at 08:36

## 2018-03-30 RX ADMIN — CLOZAPINE 100 MG: 100 TABLET ORAL at 20:09

## 2018-03-30 RX ADMIN — OLANZAPINE 10 MG: 5 TABLET, ORALLY DISINTEGRATING ORAL at 06:57

## 2018-03-30 RX ADMIN — TRAZODONE HYDROCHLORIDE 100 MG: 100 TABLET ORAL at 20:09

## 2018-03-30 ASSESSMENT — ACTIVITIES OF DAILY LIVING (ADL)
LAUNDRY: UNABLE TO COMPLETE
GROOMING: INDEPENDENT
DRESS: INDEPENDENT
DRESS: SCRUBS (BEHAVIORAL HEALTH)
ORAL_HYGIENE: INDEPENDENT
GROOMING: INDEPENDENT
ORAL_HYGIENE: INDEPENDENT

## 2018-03-30 NOTE — PROGRESS NOTES
"Isolative and withdrawn this shift, only came 0ut for supper, Spent most of the time in bed claiming that she felt tired.  Continues to endorse auditory hallucinations, voices telling her to kill herself . Pt admits to having passive suicidal ideaton. Rated her depression and anxiety 8-9/10. Offered prn Zyprexa but declined to take it this time,\" I had one this morning and it did not help much\".  Contracted for safety and will tell staff if having any active suicidal thoughts.  Utilizing the sound machine, given lavender patch and on SIO  "

## 2018-03-30 NOTE — PROGRESS NOTES
"Day 5 of admission, pt overall had a good day.  Visile in the milieu but kept to herself but attended the community meeting.  Appetite good and medication compliant.  Reported mild anxiety after supper, rated her anxiety 4-5/10, felt little shaky\" not bad as it used to\" requested for Zyprexa 5 mg, given at 1843.  Denies auditory hallucination, denies SI.  Pleasant and cooperative.  "

## 2018-03-30 NOTE — PROGRESS NOTES
Patient observed pacing in the hallway and stated that she is hearing voices telling her to kill herself. Zyprexa ODT 10 mgs given as prn for auditory hallucinations.

## 2018-03-30 NOTE — PROGRESS NOTES
SPIRITUAL HEALTH SERVICES  SPIRITUAL ASSESSMENT Progress Note  [facility] ([facility]) 3BW     REFERRAL SOURCE: Unit OT    Elma asked for prayers for Good Friday and Easter. She asked for prayers for hope and hadley. Elma said she is struggling right now with her hadley and seeks hope and peace.    PLAN: [facility] remains available for support and care.     Long Laureano  Chaplain Resident  Pager [phone]

## 2018-03-30 NOTE — PLAN OF CARE
Problem: Depressive Symptoms  Goal: Depressive Symptoms  Signs and symptoms of listed problems will be absent or manageable.   Absense of SI.  Participate in structures activities.  Eat 50 to 75% of meals.  Sleep 6 to 8 hours at night.     Attended 2 of 2 OT groups. She was social with peers and supported and encouraged a peer sitting next to her in conversation. She worked on a task requiring problem solving using figure ground and visuospatial concepts. She stated appreciating the ability to successfully complete this task with less difficulty today. She also learned a new activity requiring more complexity and problem solving with completing it in a constant and time efficient manner. Appears more focused and attentive. Affect was bright.

## 2018-03-30 NOTE — PROGRESS NOTES
Patient seen, chart reviewed, care discussed with staff.  Blood pressure (!) 147/93, pulse 93, temperature 95.8  F (35.4  C), temperature source Oral, resp. rate 16, weight 63.1 kg (139 lb 3.2 oz), SpO2 92 %.    Voices to kill self continue, she has limited resources to cope with these.  Speech decreased, affect fair, motor good and steady, no tremor.    Li is low, not toxic.    Plan:  Increase Clozaril to 100mg.    She had previously been stable on 40mg/day on Zyprexa, and relapsed with decrease; I will raise the prn to 10mg and consider return to scheduled zyprexa instead of clozaril depending on how she does.      Still needs 1:1      Current Facility-Administered Medications:      cloZAPine (CLOZARIL) tablet 100 mg, 100 mg, Oral, At Bedtime, Demarco Ventura MD     OLANZapine zydis (zyPREXA) ODT tab 10 mg, 10 mg, Oral, Q6H PRN, Demarco Ventura MD     lithium (ESKALITH) CR tablet 450 mg, 450 mg, Oral, Daily, Demarco Ventura MD, 450 mg at 03/30/18 0836     cholecalciferol (vitamin D3) tablet 2,000 Units, 2,000 Units, Oral, Daily, Demarco Ventura MD, 2,000 Units at 03/30/18 0836     traZODone (DESYREL) tablet 50 mg, 50 mg, Oral, At Bedtime PRN, Demarco Ventura MD     magnesium hydroxide (MILK OF MAGNESIA) suspension 30 mL, 30 mL, Oral, Daily PRN, Demarco Ventura MD     bisacodyl (DULCOLAX) Suppository 10 mg, 10 mg, Rectal, Daily PRN, Demarco Ventura MD     hydrOXYzine (ATARAX) tablet 50 mg, 50 mg, Oral, Q6H PRN, Demarco Ventura MD, 50 mg at 03/26/18 1102     alum & mag hydroxide-simethicone (MYLANTA ES/MAALOX  ES) suspension 30 mL, 30 mL, Oral, Q4H PRN, Demarco Ventura MD     acetaminophen (TYLENOL) tablet 650 mg, 650 mg, Oral, Q4H PRN, Demarco Ventura MD     gabapentin (NEURONTIN) capsule 300 mg, 300 mg, Oral, BID, Demarco Ventura MD, 300 mg at 03/30/18 0836     traZODone (DESYREL) tablet 100 mg, 100 mg, Oral, At Bedtime, Demarco Ventura MD, 100 mg at 03/29/18 2035

## 2018-03-30 NOTE — PLAN OF CARE
Problem: Suicidal Behavior (Adult)  Goal: Suicidal Behavior is Absent/Minimized/Managed  Pt will be absent for suicidal thoughts within 48 hours  Pt will be absent of suicidal gestures within 48 hours  Pt will be absent of auditory hallucinations by discharge  Pt will report an increase in mood  Pt will attend minimum of 3 groups daily by discharge  Pt will be compliant with medications within 48 hours.   Pt will be cooperative with consuming an adequate diet within 48 hours.    48 Hour Nursing Assessment  Pt reports the return of command hallucinations telling her to go jump into a lake and drown herself. Pt states she feels safe on the unit and able to tell staff if she has suicidal Pt states she feels hopeless and does not believe her situation can improve. She states it is too hard to try. Pt struggled with trying to add positive talk to her negative comments. Pt does respond to positive feedback, although she states she doesn't believe it. She was obviously hearing voices during the discussion. She denies any trigger for the return of voices.     Later in shift pt did report her day was going better.     SIO for SI renewed.

## 2018-03-31 LAB
GLUCOSE BLDC GLUCOMTR-MCNC: 100 MG/DL (ref 70–99)
GLUCOSE BLDC GLUCOMTR-MCNC: 97 MG/DL (ref 70–99)

## 2018-03-31 PROCEDURE — 00000146 ZZHCL STATISTIC GLUCOSE BY METER IP

## 2018-03-31 PROCEDURE — 25000132 ZZH RX MED GY IP 250 OP 250 PS 637: Performed by: PSYCHIATRY & NEUROLOGY

## 2018-03-31 PROCEDURE — 12400005 ZZH R&B MH CRITICAL SENIOR/ADOLESCENT

## 2018-03-31 RX ADMIN — VITAMIN D, TAB 1000IU (100/BT) 2000 UNITS: 25 TAB at 08:29

## 2018-03-31 RX ADMIN — GABAPENTIN 300 MG: 300 CAPSULE ORAL at 20:36

## 2018-03-31 RX ADMIN — CLOZAPINE 100 MG: 100 TABLET ORAL at 20:36

## 2018-03-31 RX ADMIN — LITHIUM CARBONATE 450 MG: 450 TABLET, EXTENDED RELEASE ORAL at 08:29

## 2018-03-31 RX ADMIN — TRAZODONE HYDROCHLORIDE 100 MG: 100 TABLET ORAL at 20:35

## 2018-03-31 RX ADMIN — GABAPENTIN 300 MG: 300 CAPSULE ORAL at 08:29

## 2018-03-31 ASSESSMENT — ACTIVITIES OF DAILY LIVING (ADL)
GROOMING: INDEPENDENT
ORAL_HYGIENE: INDEPENDENT
DRESS: SCRUBS (BEHAVIORAL HEALTH)

## 2018-03-31 NOTE — PROGRESS NOTES
"Pt reports feeling \"medium\" today. \"Which is a pretty big improvement, for me.  Usually I feel lousy.\"  Elma enjoyed spending time in her room today working on a puzzle.  Her brother, Orville, came to visit about 1330 and they visited for over an hour.  Pt denies hallucinations/SI/SIB.  Pt is independent with ADLs and has been attending to her care needs today without prompting.   "

## 2018-03-31 NOTE — PLAN OF CARE
Problem: Suicidal Behavior (Adult)  Goal: Suicidal Behavior is Absent/Minimized/Managed  Pt will be absent for suicidal thoughts within 48 hours  Pt will be absent of suicidal gestures within 48 hours  Pt will be absent of auditory hallucinations by discharge  Pt will report an increase in mood  Pt will attend minimum of 3 groups daily by discharge  Pt will be compliant with medications within 48 hours.   Pt will be cooperative with consuming an adequate diet within 48 hours.    Outcome: Improving  48 Hour Nursing Assessment  Pt denied having command hallucination or thoughts of suicide. She states her day is half-way better because of getting sleep. She also has insight into crediting her hard work at finding activities to keep herself busy with crosswords and puzzles and using relaxation skills. She attended groups.   She also reports feeling hopeful and during her visit with her brother, he also expressed hope.   Support and reinforcement of use of positive thinking discussed yesterday.     She is showing less isolation, no PRN Zyprexa since yesterday. She has not used her PRN dose of trazodone and is able to sleep with scheduled 100 mg only.     Renewed SIO for suicidal thoughts with command hallucinations as they have been present more than not.

## 2018-04-01 LAB
GLUCOSE BLDC GLUCOMTR-MCNC: 103 MG/DL (ref 70–99)
GLUCOSE BLDC GLUCOMTR-MCNC: 99 MG/DL (ref 70–99)

## 2018-04-01 PROCEDURE — 00000146 ZZHCL STATISTIC GLUCOSE BY METER IP

## 2018-04-01 PROCEDURE — 25000132 ZZH RX MED GY IP 250 OP 250 PS 637: Performed by: PSYCHIATRY & NEUROLOGY

## 2018-04-01 PROCEDURE — 12400005 ZZH R&B MH CRITICAL SENIOR/ADOLESCENT

## 2018-04-01 RX ADMIN — VITAMIN D, TAB 1000IU (100/BT) 2000 UNITS: 25 TAB at 08:04

## 2018-04-01 RX ADMIN — CLOZAPINE 100 MG: 100 TABLET ORAL at 20:32

## 2018-04-01 RX ADMIN — GABAPENTIN 300 MG: 300 CAPSULE ORAL at 08:04

## 2018-04-01 RX ADMIN — GABAPENTIN 300 MG: 300 CAPSULE ORAL at 20:32

## 2018-04-01 RX ADMIN — LITHIUM CARBONATE 450 MG: 450 TABLET, EXTENDED RELEASE ORAL at 08:04

## 2018-04-01 RX ADMIN — TRAZODONE HYDROCHLORIDE 100 MG: 100 TABLET ORAL at 20:32

## 2018-04-01 ASSESSMENT — ACTIVITIES OF DAILY LIVING (ADL)
GROOMING: INDEPENDENT
DRESS: INDEPENDENT
LAUNDRY: UNABLE TO COMPLETE
LAUNDRY: UNABLE TO COMPLETE
GROOMING: INDEPENDENT
DRESS: INDEPENDENT
ORAL_HYGIENE: INDEPENDENT
ORAL_HYGIENE: INDEPENDENT

## 2018-04-01 NOTE — PROGRESS NOTES
04/01/18 1230   Behavioral Health   Hallucinations auditory   Thinking poor concentration   Orientation person: oriented;place: oriented;date: oriented;time: oriented   Memory baseline memory   Insight insight appropriate to situation   Judgement impaired   Eye Contact at examiner   Affect full range affect   Mood mood is calm;depressed   Physical Appearance/Attire attire appropriate to age and situation;neat   Hygiene other (see comment)  (adequate)   Self Injury other (see comment)  (pt denies)   Elopement (none noted)   Activity withdrawn;isolative   Speech clear;coherent  (soft spoken)   Medication Sensitivity no stated side effects;no observed side effects   Psychomotor / Gait balanced;steady   Safety   Suicidality SIO (Status Individual Observation)  (NOTE - order will specify distance;Status 15;Unpredictable frequency of checking on patient;Optimize communication / relationship to minimize opportunity for self-harm;Promote patient engagement with treatment process;Identify and strengthen protective factors   Coping/Psychosocial   Verbalized Emotional State depression   Plan Of Care Reviewed With patient   Patient Agreement with Plan of Care agrees   Psycho Education   Type of Intervention 1:1 intervention   Response participates, initiates socially appropriate   Hours 0.5   Treatment Detail (check in)   Group Therapy Session   Group Attendance refused to attend group session   Activities of Daily Living   Hygiene/Grooming independent   Oral Hygiene independent   Dress independent   Laundry unable to complete   Room Organization independent   Activity   Activity Assistance Provided independent   Hygiene Care Assistance   Oral Care teeth brushed  (per patient)   Behavioral Health Interventions   Depression maintain safety precautions;maintain safe secure environment;encourage nutrition and hydration;encourage participation / independence with adls;provide emotional support;establish therapeutic  "relationship;assist with developing and utilizing healthy coping strategies;build upon strengths;monitor need for prn medication;monitor confusion, memory loss, decision making ability and reorient / intervene as needed   Social and Therapeutic Interventions (Depression) encourage socialization with peers;encourage effective boundaries with peers;encourage participation in therapeutic groups and milieu activities     Pt was in room most of shift working on puzzle and resting. Pt came out for meals eating alone in the corner. Pt attended afternoon group and participated, but did not participate in morning group. Pt affect blunted/flat, depressed. Pt stated today had been \"a bad day\". Pt endorses AH and stated \"When I laid down to rest it helped me focus on other things\". Pt stated \"I feel bad for the people that have to sit here with me.\" Pt referring to SIO. Pt reminded that staff is with her to remain safe. Pt endorsed SI thoughts through AH. Pt stated feelings of hopelessness regarding discharge, but was encouraged to stay positive. Pt calm, cooperative, and pleasant with check in.  "

## 2018-04-02 LAB
GLUCOSE BLDC GLUCOMTR-MCNC: 110 MG/DL (ref 70–99)
GLUCOSE BLDC GLUCOMTR-MCNC: 99 MG/DL (ref 70–99)

## 2018-04-02 PROCEDURE — 25000132 ZZH RX MED GY IP 250 OP 250 PS 637: Performed by: PSYCHIATRY & NEUROLOGY

## 2018-04-02 PROCEDURE — 12400007 ZZH R&B MH INTERMEDIATE UMMC

## 2018-04-02 PROCEDURE — 97150 GROUP THERAPEUTIC PROCEDURES: CPT | Mod: GO

## 2018-04-02 PROCEDURE — 00000146 ZZHCL STATISTIC GLUCOSE BY METER IP

## 2018-04-02 RX ADMIN — GABAPENTIN 300 MG: 300 CAPSULE ORAL at 08:17

## 2018-04-02 RX ADMIN — VITAMIN D, TAB 1000IU (100/BT) 2000 UNITS: 25 TAB at 08:17

## 2018-04-02 RX ADMIN — GABAPENTIN 300 MG: 300 CAPSULE ORAL at 20:22

## 2018-04-02 RX ADMIN — CLOZAPINE 100 MG: 100 TABLET ORAL at 20:22

## 2018-04-02 RX ADMIN — LITHIUM CARBONATE 450 MG: 450 TABLET, EXTENDED RELEASE ORAL at 08:17

## 2018-04-02 RX ADMIN — TRAZODONE HYDROCHLORIDE 100 MG: 100 TABLET ORAL at 20:22

## 2018-04-02 ASSESSMENT — ACTIVITIES OF DAILY LIVING (ADL)
GROOMING: INDEPENDENT
GROOMING: INDEPENDENT
ORAL_HYGIENE: INDEPENDENT
DRESS: INDEPENDENT
ORAL_HYGIENE: INDEPENDENT
DRESS: INDEPENDENT

## 2018-04-02 NOTE — PROGRESS NOTES
"Patient states that she is having a bad day and things are the way they were before with the voices.  Patient did attend community meeting and contributed relatively well. Patient said \"Rodolfo feeling low and its because of the voices.  SI thoughts but could contract for safety and very appreciative of staff involvement.  "

## 2018-04-02 NOTE — PLAN OF CARE
Problem: Patient Care Overview  Goal: Team Discussion  Team Plan:   BEHAVIORAL TEAM DISCUSSION    Participants: Dr Lorenzo MD, Jose Manuel Clark, RN and DWAIN Montiel  Progress: On-going  Continued Stay Criteria/Rationale: Psychosis/ SI  Medical/Physical: Refer to medical  Precautions:   Behavioral Orders   Procedures     Code 1 - Restrict to Unit     Routine Programming     As clinically indicated     Plan: Pts overall sx(s) are improving, good eye contact, medication compliant and denies SI today. SIO for SI stopped today. Pt has Crisis SW recently open to support pt after discharge Sarah Rafi [phone]. Pt will return to live with her brother once psychiatric stabilization is achieved with community mental health providers in place. Pt is voluntary.    Rationale for change in precautions or plan: SIO stopped by Dr Ventura.

## 2018-04-02 NOTE — PLAN OF CARE
"Problem: Depressive Symptoms  Goal: Depressive Symptoms  Signs and symptoms of listed problems will be absent or manageable.   Absense of SI.  Participate in structures activities.  Eat 50 to 75% of meals.  Sleep 6 to 8 hours at night.   Outcome: Improving  48 hour assessment    Pt is reporting improved mood.  She states that voices are \"softer\"  She is using distraction and coping skills.  Contracts for safety.  Admits to chronic SI thoughts- no command voices or active SI on the unit.    Denies med side effects or physical concerns.  She reports good appetite and sleep.  When asked about coping skills at home- pt states \"I clean a lot\"     Independent with ADL's, cares.  Pleasant, bright upon approach.  Smiles easily, responds to humor.      "

## 2018-04-02 NOTE — PROGRESS NOTES
Patient seen, chart reviewed, care discussed with staff.  Blood pressure (!) 140/92, pulse 100, temperature 98.3  F (36.8  C), temperature source Tympanic, resp. rate 16, weight 63.9 kg (140 lb 14.4 oz), SpO2 96 %.    Alert.  Affect fair to good.  Speech normal for her.  Eye contact good.  Psychomotor behavior and gait  normal.  No delusions or hallucinations today, improved.  Thoughts logical.  Associations intact. Cognitions same.  Not suicidal. She states she could tell staff if voices or suicidal thoughts return    Plan: same meds:    Current Facility-Administered Medications:      cloZAPine (CLOZARIL) tablet 100 mg, 100 mg, Oral, At Bedtime, Demarco Ventura MD, 100 mg at 04/01/18 2032     OLANZapine zydis (zyPREXA) ODT tab 10 mg, 10 mg, Oral, Q6H PRN, Demarco Ventura MD     lithium (ESKALITH) CR tablet 450 mg, 450 mg, Oral, Daily, Demarco Ventura MD, 450 mg at 04/02/18 0817     cholecalciferol (vitamin D3) tablet 2,000 Units, 2,000 Units, Oral, Daily, Demarco Ventura MD, 2,000 Units at 04/02/18 0817     traZODone (DESYREL) tablet 50 mg, 50 mg, Oral, At Bedtime PRN, Demarco Ventura MD     magnesium hydroxide (MILK OF MAGNESIA) suspension 30 mL, 30 mL, Oral, Daily PRN, Demarco Ventura MD     bisacodyl (DULCOLAX) Suppository 10 mg, 10 mg, Rectal, Daily PRN, Demarco Ventura MD     hydrOXYzine (ATARAX) tablet 50 mg, 50 mg, Oral, Q6H PRN, Demarco Ventura MD, 50 mg at 03/26/18 1102     alum & mag hydroxide-simethicone (MYLANTA ES/MAALOX  ES) suspension 30 mL, 30 mL, Oral, Q4H PRN, Demarco Ventura MD     acetaminophen (TYLENOL) tablet 650 mg, 650 mg, Oral, Q4H PRN, Demarco Ventura MD     gabapentin (NEURONTIN) capsule 300 mg, 300 mg, Oral, BID, Demarco Ventura MD, 300 mg at 04/02/18 0817     traZODone (DESYREL) tablet 100 mg, 100 mg, Oral, At Bedtime, Demarco Ventura MD, 100 mg at 04/01/18 2032

## 2018-04-02 NOTE — PLAN OF CARE
Problem: Depressive Symptoms  Goal: Social and Therapeutic (Depression)  Signs and symptoms of listed problems will be absent or manageable.       Pt. Attended 1 of 2 scheduled OT sessions today. Pt. Actively participated in goal directed task session. Pt asked directly for needed materials.  Demonstrated good focus.  Pt spent her time   Working on a puzzle.  Pt talked about enjoying the challenge of the activity.  Pt.was cooperative and pleasant throughout session.

## 2018-04-03 LAB
GLUCOSE BLDC GLUCOMTR-MCNC: 111 MG/DL (ref 70–99)
GLUCOSE BLDC GLUCOMTR-MCNC: 127 MG/DL (ref 70–99)

## 2018-04-03 PROCEDURE — 25000132 ZZH RX MED GY IP 250 OP 250 PS 637: Performed by: PSYCHIATRY & NEUROLOGY

## 2018-04-03 PROCEDURE — H2032 ACTIVITY THERAPY, PER 15 MIN: HCPCS

## 2018-04-03 PROCEDURE — 00000146 ZZHCL STATISTIC GLUCOSE BY METER IP

## 2018-04-03 PROCEDURE — 12400007 ZZH R&B MH INTERMEDIATE UMMC

## 2018-04-03 PROCEDURE — 97150 GROUP THERAPEUTIC PROCEDURES: CPT | Mod: GO

## 2018-04-03 RX ADMIN — TRAZODONE HYDROCHLORIDE 100 MG: 100 TABLET ORAL at 21:10

## 2018-04-03 RX ADMIN — CLOZAPINE 100 MG: 100 TABLET ORAL at 21:10

## 2018-04-03 RX ADMIN — VITAMIN D, TAB 1000IU (100/BT) 2000 UNITS: 25 TAB at 08:08

## 2018-04-03 RX ADMIN — GABAPENTIN 300 MG: 300 CAPSULE ORAL at 08:08

## 2018-04-03 RX ADMIN — LITHIUM CARBONATE 450 MG: 450 TABLET, EXTENDED RELEASE ORAL at 08:08

## 2018-04-03 RX ADMIN — GABAPENTIN 300 MG: 300 CAPSULE ORAL at 21:10

## 2018-04-03 ASSESSMENT — ACTIVITIES OF DAILY LIVING (ADL)
LAUNDRY: UNABLE TO COMPLETE
DRESS: SCRUBS (BEHAVIORAL HEALTH)
GROOMING: INDEPENDENT
DRESS: SCRUBS (BEHAVIORAL HEALTH);INDEPENDENT
ORAL_HYGIENE: INDEPENDENT
ORAL_HYGIENE: INDEPENDENT
GROOMING: INDEPENDENT

## 2018-04-03 NOTE — PLAN OF CARE
Problem: Patient Care Overview  Goal: Plan of Care/Patient Progress Review  Illness Management Recovery model:  Wellness Strategies.    Patient identified the following actions/activities they can do to stay well.    1. Eat well

## 2018-04-03 NOTE — PROGRESS NOTES
04/03/18 1324   Behavioral Health   Hallucinations denies / not responding to hallucinations   Thinking intact   Orientation person: oriented;place: oriented;date: oriented;time: oriented   Memory baseline memory   Insight admits / accepts   Judgement (improving)   Eye Contact at examiner   Affect full range affect;sad   Mood depressed;anxious  (worse during AM  and improved as day went on)   Physical Appearance/Attire attire appropriate to age and situation   Hygiene well groomed   Suicidality other (see comments)  (denied)   1. Wish to be Dead No   2. Non-Specific Active Suicidal Thoughts  No   Activity withdrawn;isolative   Speech clear;coherent   Psychomotor / Gait balanced;steady   Coping/Psychosocial   Verbalized Emotional State depression;anxiety   Plan Of Care Reviewed With patient   Patient Agreement with Plan of Care agrees   Psycho Education   Type of Intervention structured groups   Response refuses   Activities of Daily Living   Hygiene/Grooming independent   Oral Hygiene independent   Dress scrubs (behavioral health)   Laundry unable to complete   Room Organization independent   Activity   Activity Assistance Provided independent   Behavioral Health Interventions   Depression maintain safety precautions;maintain safe secure environment   Social and Therapeutic Interventions (Depression) encourage socialization with peers;encourage participation in therapeutic groups and milieu activities     Pt denied SI/HI. Pt stated that her anxiety/depression was worse during the AM hours but then improves as the day goes on. Pt stated that she was hearing voices when she woke up but those have gone away as well. Pt does not attend groups but does attend meals. No concerns or issues to report otherwise.

## 2018-04-03 NOTE — PLAN OF CARE
Problem: Depressive Symptoms  Goal: Depressive Symptoms  Signs and symptoms of listed problems will be absent or manageable.   Absense of SI.  Participate in structures activities.  Eat 50 to 75% of meals.  Sleep 6 to 8 hours at night.     Attended 1 of 2 OT groups. Affect brightened with interactions. She was independent in gathering supplies and working on a task requiring problem solving using visuospatial concepts. She was successful with the outcome, was social and assisted a peer.

## 2018-04-03 NOTE — PROGRESS NOTES
Denies SI ; denies wish to die. States she remains very depressed , feels discouraged that she is not getting better more quickly. Responds to reassurance. States her sleep has improved.

## 2018-04-04 LAB
ALBUMIN UR-MCNC: 30 MG/DL
AMORPH CRY #/AREA URNS HPF: ABNORMAL /HPF
APPEARANCE UR: ABNORMAL
BACTERIA #/AREA URNS HPF: ABNORMAL /HPF
BILIRUB UR QL STRIP: NEGATIVE
COLOR UR AUTO: YELLOW
GLUCOSE UR STRIP-MCNC: NEGATIVE MG/DL
HGB UR QL STRIP: ABNORMAL
KETONES UR STRIP-MCNC: NEGATIVE MG/DL
LEUKOCYTE ESTERASE UR QL STRIP: NEGATIVE
MUCOUS THREADS #/AREA URNS LPF: PRESENT /LPF
NITRATE UR QL: NEGATIVE
PH UR STRIP: 5.5 PH (ref 5–7)
RBC #/AREA URNS AUTO: 3 /HPF (ref 0–2)
SOURCE: ABNORMAL
SP GR UR STRIP: 1.03 (ref 1–1.03)
UROBILINOGEN UR STRIP-MCNC: NORMAL MG/DL (ref 0–2)
WBC #/AREA URNS AUTO: 0 /HPF (ref 0–5)

## 2018-04-04 PROCEDURE — 12400007 ZZH R&B MH INTERMEDIATE UMMC

## 2018-04-04 PROCEDURE — 25000132 ZZH RX MED GY IP 250 OP 250 PS 637: Performed by: PSYCHIATRY & NEUROLOGY

## 2018-04-04 PROCEDURE — 97150 GROUP THERAPEUTIC PROCEDURES: CPT | Mod: GO

## 2018-04-04 PROCEDURE — 81001 URINALYSIS AUTO W/SCOPE: CPT | Performed by: PSYCHIATRY & NEUROLOGY

## 2018-04-04 PROCEDURE — 90853 GROUP PSYCHOTHERAPY: CPT

## 2018-04-04 RX ORDER — LITHIUM CARBONATE 450 MG
450 TABLET, EXTENDED RELEASE ORAL DAILY
Qty: 30 TABLET | Refills: 3 | Status: ON HOLD | OUTPATIENT
Start: 2018-04-05 | End: 2018-04-11

## 2018-04-04 RX ORDER — CLOZAPINE 50 MG/1
150 TABLET ORAL AT BEDTIME
Qty: 21 TABLET | Refills: 11 | Status: ON HOLD | OUTPATIENT
Start: 2018-04-04 | End: 2018-04-11

## 2018-04-04 RX ORDER — GABAPENTIN 300 MG/1
300 CAPSULE ORAL 2 TIMES DAILY
Qty: 60 CAPSULE | Refills: 3 | Status: ON HOLD | OUTPATIENT
Start: 2018-04-04 | End: 2018-04-11

## 2018-04-04 RX ORDER — TRAZODONE HYDROCHLORIDE 100 MG/1
100 TABLET ORAL AT BEDTIME
Qty: 30 TABLET | Refills: 3 | Status: ON HOLD | OUTPATIENT
Start: 2018-04-04 | End: 2018-04-11

## 2018-04-04 RX ADMIN — TRAZODONE HYDROCHLORIDE 100 MG: 100 TABLET ORAL at 20:50

## 2018-04-04 RX ADMIN — GABAPENTIN 300 MG: 300 CAPSULE ORAL at 20:50

## 2018-04-04 RX ADMIN — GABAPENTIN 300 MG: 300 CAPSULE ORAL at 08:05

## 2018-04-04 RX ADMIN — ACETAMINOPHEN 650 MG: 325 TABLET ORAL at 08:05

## 2018-04-04 RX ADMIN — VITAMIN D, TAB 1000IU (100/BT) 2000 UNITS: 25 TAB at 08:05

## 2018-04-04 RX ADMIN — LITHIUM CARBONATE 450 MG: 450 TABLET, EXTENDED RELEASE ORAL at 08:05

## 2018-04-04 RX ADMIN — CLOZAPINE 150 MG: 100 TABLET ORAL at 20:50

## 2018-04-04 ASSESSMENT — ACTIVITIES OF DAILY LIVING (ADL)
DRESS: INDEPENDENT
GROOMING: INDEPENDENT
ORAL_HYGIENE: INDEPENDENT
LAUNDRY: UNABLE TO COMPLETE
ORAL_HYGIENE: INDEPENDENT
GROOMING: INDEPENDENT

## 2018-04-04 NOTE — PROGRESS NOTES
Reported to writer that she had command hallucinations earlier today to kill self . She said that she was successful at distracting self when she had the hallucinations. Sad, blunt affect. Worried about her brother who is in hospital for kidney stone removal. Denies SI, denies wish to die. Isolative yet did attend community meeting. Later evening, worked on puzzle in her room.

## 2018-04-04 NOTE — PLAN OF CARE
"Problem: Depressive Symptoms  Goal: Depressive Symptoms  Signs and symptoms of listed problems will be absent or manageable.   Absense of SI.  Participate in structures activities.  Eat 50 to 75% of meals.  Sleep 6 to 8 hours at night.   Outcome: Improving  Pt denies SI.  \"I am back to myself\"  Did not hear voices today.    Independent with ADL's.  \"I want to get back home tomorrow if I can.\"  \"I miss feeding all my animals\"    States her brother is home, and was not hospitalized.  Denies any issues with her living situation.    Pt reports good appetite and sleep.  She states that she is taking fluids well, but isn't urinating very much.    See orders for UA and PRN bladder scan.    Pt will have labs drawn tomorrow in anticipation that she could d/c.        "

## 2018-04-04 NOTE — PROGRESS NOTES
Patient seen, chart reviewed, care discussed with staff.  Blood pressure 131/82, pulse 109, temperature 99.6  F (37.6  C), temperature source Tympanic, resp. rate 16, weight 64.1 kg (141 lb 4.8 oz), SpO2 96 %.    Alert.  Affect fair.  Speech normal.  Eye contact good.  Psychomotor behavior and gait  normal.  No delusions or hallucinations today, some hallucinations yesterday.  Thoughts logical.  Associations intact. Cognitions intact.  Not suicidal.    Plan: 3-4 more days to ensure safety at home  2.  Increase Clozaril  3.  NP Prem to assume care      Current Facility-Administered Medications:      cloZAPine (CLOZARIL) tablet 150 mg, 150 mg, Oral, At Bedtime, Demarco Ventura MD     OLANZapine zydis (zyPREXA) ODT tab 10 mg, 10 mg, Oral, Q6H PRN, Demarco Ventura MD     lithium (ESKALITH) CR tablet 450 mg, 450 mg, Oral, Daily, Demarco Ventura MD, 450 mg at 04/04/18 0805     cholecalciferol (vitamin D3) tablet 2,000 Units, 2,000 Units, Oral, Daily, Demarco Ventura MD, 2,000 Units at 04/04/18 0805     traZODone (DESYREL) tablet 50 mg, 50 mg, Oral, At Bedtime PRN, Demarco Ventura MD     magnesium hydroxide (MILK OF MAGNESIA) suspension 30 mL, 30 mL, Oral, Daily PRN, Demarco Ventura MD     bisacodyl (DULCOLAX) Suppository 10 mg, 10 mg, Rectal, Daily PRN, Demarco Ventura MD     hydrOXYzine (ATARAX) tablet 50 mg, 50 mg, Oral, Q6H PRN, Demarco Ventura MD, 50 mg at 03/26/18 1102     alum & mag hydroxide-simethicone (MYLANTA ES/MAALOX  ES) suspension 30 mL, 30 mL, Oral, Q4H PRN, Demarco Ventura MD     acetaminophen (TYLENOL) tablet 650 mg, 650 mg, Oral, Q4H PRN, Demarco Ventura MD, 650 mg at 04/04/18 0805     gabapentin (NEURONTIN) capsule 300 mg, 300 mg, Oral, BID, Demarco Ventura MD, 300 mg at 04/04/18 0805     traZODone (DESYREL) tablet 100 mg, 100 mg, Oral, At Bedtime, Demarco Ventura MD, 100 mg at 04/03/18 2110  Recent Results (from the past 336 hour(s))   CBC with platelets differential    Collection Time: 03/24/18  1:39 PM    Result Value Ref Range    WBC 9.0 4.0 - 11.0 10e9/L    RBC Count 3.67 (L) 3.8 - 5.2 10e12/L    Hemoglobin 11.7 11.7 - 15.7 g/dL    Hematocrit 36.8 35.0 - 47.0 %     78 - 100 fl    MCH 31.9 26.5 - 33.0 pg    MCHC 31.8 31.5 - 36.5 g/dL    RDW 14.5 10.0 - 15.0 %    Platelet Count 278 150 - 450 10e9/L    Diff Method Automated Method     % Neutrophils 84.3 %    % Lymphocytes 10.8 %    % Monocytes 4.0 %    % Eosinophils 0.1 %    % Basophils 0.2 %    % Immature Granulocytes 0.6 %    Nucleated RBCs 0 0 /100    Absolute Neutrophil 7.6 1.6 - 8.3 10e9/L    Absolute Lymphocytes 1.0 0.8 - 5.3 10e9/L    Absolute Monocytes 0.4 0.0 - 1.3 10e9/L    Absolute Eosinophils 0.0 0.0 - 0.7 10e9/L    Absolute Basophils 0.0 0.0 - 0.2 10e9/L    Abs Immature Granulocytes 0.1 0 - 0.4 10e9/L    Absolute Nucleated RBC 0.0    Vitamin B12    Collection Time: 03/25/18  7:30 AM   Result Value Ref Range    Vitamin B12 669 193 - 986 pg/mL   Folate    Collection Time: 03/25/18  7:30 AM   Result Value Ref Range    Folate 10.7 >5.4 ng/mL   Vitamin D    Collection Time: 03/25/18  7:30 AM   Result Value Ref Range    Vitamin D Deficiency screening 30 20 - 75 ug/L   Glucose by meter    Collection Time: 03/25/18  4:57 PM   Result Value Ref Range    Glucose 103 (H) 70 - 99 mg/dL   Glucose by meter    Collection Time: 03/26/18  7:46 AM   Result Value Ref Range    Glucose 119 (H) 70 - 99 mg/dL   Glucose by meter    Collection Time: 03/26/18  4:57 PM   Result Value Ref Range    Glucose 91 70 - 99 mg/dL   Lithium level    Collection Time: 03/27/18  7:43 AM   Result Value Ref Range    Lithium Level <0.20 (L) 0.60 - 1.20 mmol/L   Glucose by meter    Collection Time: 03/27/18  7:57 AM   Result Value Ref Range    Glucose 112 (H) 70 - 99 mg/dL   Glucose by meter    Collection Time: 03/28/18  8:05 AM   Result Value Ref Range    Glucose 117 (H) 70 - 99 mg/dL   Glucose by meter    Collection Time: 03/28/18  5:30 PM   Result Value Ref Range    Glucose 94 70 - 99  mg/dL   Glucose by meter    Collection Time: 03/29/18  7:53 AM   Result Value Ref Range    Glucose 112 (H) 70 - 99 mg/dL   Glucose by meter    Collection Time: 03/29/18  5:28 PM   Result Value Ref Range    Glucose 115 (H) 70 - 99 mg/dL   WBC and differential    Collection Time: 03/30/18  7:45 AM   Result Value Ref Range    WBC 10.3 4.0 - 11.0 10e9/L    Diff Method Automated Method     % Neutrophils 79.0 %    % Lymphocytes 13.3 %    % Monocytes 6.1 %    % Eosinophils 1.0 %    % Basophils 0.2 %    % Immature Granulocytes 0.4 %    Nucleated RBCs 0 0 /100    Absolute Neutrophil 8.2 1.6 - 8.3 10e9/L    Absolute Lymphocytes 1.4 0.8 - 5.3 10e9/L    Absolute Monocytes 0.6 0.0 - 1.3 10e9/L    Absolute Eosinophils 0.1 0.0 - 0.7 10e9/L    Absolute Basophils 0.0 0.0 - 0.2 10e9/L    Abs Immature Granulocytes 0.0 0 - 0.4 10e9/L    Absolute Nucleated RBC 0.0    Lithium level    Collection Time: 03/30/18  7:45 AM   Result Value Ref Range    Lithium Level <0.20 (L) 0.60 - 1.20 mmol/L   Glucose by meter    Collection Time: 03/30/18  8:05 AM   Result Value Ref Range    Glucose 101 (H) 70 - 99 mg/dL   Glucose by meter    Collection Time: 03/30/18  5:14 PM   Result Value Ref Range    Glucose 100 (H) 70 - 99 mg/dL   Glucose by meter    Collection Time: 03/31/18  8:10 AM   Result Value Ref Range    Glucose 100 (H) 70 - 99 mg/dL   Glucose by meter    Collection Time: 03/31/18  5:19 PM   Result Value Ref Range    Glucose 97 70 - 99 mg/dL   Glucose by meter    Collection Time: 04/01/18  7:53 AM   Result Value Ref Range    Glucose 103 (H) 70 - 99 mg/dL   Glucose by meter    Collection Time: 04/01/18  5:20 PM   Result Value Ref Range    Glucose 99 70 - 99 mg/dL   Glucose by meter    Collection Time: 04/02/18  7:52 AM   Result Value Ref Range    Glucose 110 (H) 70 - 99 mg/dL   Glucose by meter    Collection Time: 04/02/18  4:44 PM   Result Value Ref Range    Glucose 99 70 - 99 mg/dL   Glucose by meter    Collection Time: 04/03/18  7:52 AM    Result Value Ref Range    Glucose 127 (H) 70 - 99 mg/dL   Glucose by meter    Collection Time: 04/03/18  5:12 PM   Result Value Ref Range    Glucose 111 (H) 70 - 99 mg/dL

## 2018-04-04 NOTE — PROGRESS NOTES
Pt denies SI and AH, states what is different between last admit to [facility] with subsequent overdose is that she no longer wants to die.   Able to identify triggers. Her safety plan is to be busy, ignore voices, and buy some puzzles. Her support person is her brother.   Completed her Coping Plan.     UA sample sent. Urinate small amount. C/O of feeling bladder not empty. PVR 32 ml.   Will monitor urine OP.   Pt given depends as she is afraid she will not be able to hold her bladder.        Addendum- while pt was visiting with her brother she told staff she was having a relapse and thoughts of killing herself, no plan. She started to state that she felt she was never going to get any better. Pt encouraged to use her coping skills she has been developing while on unit. She began stating that she is disappointed, because she does not feel she is ready for discharge. Rajat, brother, expressed concerns that pt only focused on anxiety and fear of going home. He confirmed that pt tense to have problems with urinary retention when anxious.     Recommended Rajat call Sumi Velasquez in the morning to discuss his concerns.

## 2018-04-04 NOTE — PROGRESS NOTES
Facilitated a group on stress management.  Discussed triggers of stress and practiced strategies for coping with stress. Pt participated fully in group.

## 2018-04-04 NOTE — PLAN OF CARE
Problem: Depressive Symptoms  Goal: Social and Therapeutic (Depression)  Signs and symptoms of listed problems will be absent or manageable.       Pt. Attended 1 of 2 scheduled OT sessions today. Pt. Actively participated in goal directed task session. Pt spent her time working on a challenging jig saw puzzle.  Affect appeared bright upon completion of the task. Demonstrated good focus.  Pt.was cooperative and pleasant throughout session.

## 2018-04-05 ENCOUNTER — APPOINTMENT (OUTPATIENT)
Dept: GENERAL RADIOLOGY | Facility: CLINIC | Age: 63
End: 2018-04-05
Attending: NURSE PRACTITIONER
Payer: COMMERCIAL

## 2018-04-05 ENCOUNTER — APPOINTMENT (OUTPATIENT)
Dept: GENERAL RADIOLOGY | Facility: CLINIC | Age: 63
End: 2018-04-05
Attending: SPECIALIST
Payer: COMMERCIAL

## 2018-04-05 LAB
ALBUMIN SERPL-MCNC: 3 G/DL (ref 3.4–5)
ALP SERPL-CCNC: 88 U/L (ref 40–150)
ALT SERPL W P-5'-P-CCNC: 8 U/L (ref 0–50)
ANION GAP SERPL CALCULATED.3IONS-SCNC: 9 MMOL/L (ref 3–14)
AST SERPL W P-5'-P-CCNC: 12 U/L (ref 0–45)
BASOPHILS # BLD AUTO: 0 10E9/L (ref 0–0.2)
BASOPHILS NFR BLD AUTO: 0.2 %
BILIRUB SERPL-MCNC: 0.2 MG/DL (ref 0.2–1.3)
BUN SERPL-MCNC: 12 MG/DL (ref 7–30)
CALCIUM SERPL-MCNC: 8.2 MG/DL (ref 8.5–10.1)
CHLORIDE SERPL-SCNC: 103 MMOL/L (ref 94–109)
CO2 SERPL-SCNC: 24 MMOL/L (ref 20–32)
CREAT SERPL-MCNC: 0.5 MG/DL (ref 0.52–1.04)
CRP SERPL-MCNC: 47.6 MG/L (ref 0–8)
DIFFERENTIAL METHOD BLD: ABNORMAL
EOSINOPHIL # BLD AUTO: 0.3 10E9/L (ref 0–0.7)
EOSINOPHIL NFR BLD AUTO: 6.1 %
ERYTHROCYTE [DISTWIDTH] IN BLOOD BY AUTOMATED COUNT: 14.5 % (ref 10–15)
GFR SERPL CREATININE-BSD FRML MDRD: >90 ML/MIN/1.7M2
GLUCOSE SERPL-MCNC: 108 MG/DL (ref 70–99)
HCT VFR BLD AUTO: 38.7 % (ref 35–47)
HGB BLD-MCNC: 12.5 G/DL (ref 11.7–15.7)
IMM GRANULOCYTES # BLD: 0 10E9/L (ref 0–0.4)
IMM GRANULOCYTES NFR BLD: 0.2 %
LACTATE BLD-SCNC: 1 MMOL/L (ref 0.7–2)
LYMPHOCYTES # BLD AUTO: 0.7 10E9/L (ref 0.8–5.3)
LYMPHOCYTES NFR BLD AUTO: 13.4 %
MCH RBC QN AUTO: 31.9 PG (ref 26.5–33)
MCHC RBC AUTO-ENTMCNC: 32.3 G/DL (ref 31.5–36.5)
MCV RBC AUTO: 99 FL (ref 78–100)
MONOCYTES # BLD AUTO: 0.5 10E9/L (ref 0–1.3)
MONOCYTES NFR BLD AUTO: 9.7 %
NEUTROPHILS # BLD AUTO: 3.8 10E9/L (ref 1.6–8.3)
NEUTROPHILS NFR BLD AUTO: 70.4 %
NRBC # BLD AUTO: 0 10*3/UL
NRBC BLD AUTO-RTO: 0 /100
PLATELET # BLD AUTO: 206 10E9/L (ref 150–450)
POTASSIUM SERPL-SCNC: 3.7 MMOL/L (ref 3.4–5.3)
PROCALCITONIN SERPL-MCNC: 0.29 NG/ML
PROT SERPL-MCNC: 7.4 G/DL (ref 6.8–8.8)
RBC # BLD AUTO: 3.92 10E12/L (ref 3.8–5.2)
SODIUM SERPL-SCNC: 136 MMOL/L (ref 133–144)
WBC # BLD AUTO: 4 10E9/L (ref 4–11)
WBC # BLD AUTO: 5.4 10E9/L (ref 4–11)

## 2018-04-05 PROCEDURE — 99207 ZZC CDG-MDM COMPONENT: MEETS MODERATE - UP CODED: CPT | Performed by: NURSE PRACTITIONER

## 2018-04-05 PROCEDURE — 12400007 ZZH R&B MH INTERMEDIATE UMMC

## 2018-04-05 PROCEDURE — 36415 COLL VENOUS BLD VENIPUNCTURE: CPT | Performed by: PSYCHIATRY & NEUROLOGY

## 2018-04-05 PROCEDURE — 87631 RESP VIRUS 3-5 TARGETS: CPT | Performed by: SPECIALIST

## 2018-04-05 PROCEDURE — 36415 COLL VENOUS BLD VENIPUNCTURE: CPT | Performed by: NURSE PRACTITIONER

## 2018-04-05 PROCEDURE — 71046 X-RAY EXAM CHEST 2 VIEWS: CPT

## 2018-04-05 PROCEDURE — 80053 COMPREHEN METABOLIC PANEL: CPT | Performed by: NURSE PRACTITIONER

## 2018-04-05 PROCEDURE — 86140 C-REACTIVE PROTEIN: CPT | Performed by: NURSE PRACTITIONER

## 2018-04-05 PROCEDURE — 85027 COMPLETE CBC AUTOMATED: CPT | Performed by: NURSE PRACTITIONER

## 2018-04-05 PROCEDURE — 74019 RADEX ABDOMEN 2 VIEWS: CPT

## 2018-04-05 PROCEDURE — 99233 SBSQ HOSP IP/OBS HIGH 50: CPT | Performed by: NURSE PRACTITIONER

## 2018-04-05 PROCEDURE — 25000132 ZZH RX MED GY IP 250 OP 250 PS 637: Performed by: PSYCHIATRY & NEUROLOGY

## 2018-04-05 PROCEDURE — 85004 AUTOMATED DIFF WBC COUNT: CPT | Performed by: PSYCHIATRY & NEUROLOGY

## 2018-04-05 PROCEDURE — 84145 PROCALCITONIN (PCT): CPT | Performed by: NURSE PRACTITIONER

## 2018-04-05 PROCEDURE — 87040 BLOOD CULTURE FOR BACTERIA: CPT | Performed by: NURSE PRACTITIONER

## 2018-04-05 PROCEDURE — 83605 ASSAY OF LACTIC ACID: CPT | Performed by: NURSE PRACTITIONER

## 2018-04-05 PROCEDURE — 93005 ELECTROCARDIOGRAM TRACING: CPT

## 2018-04-05 PROCEDURE — 25000125 ZZHC RX 250: Performed by: NURSE PRACTITIONER

## 2018-04-05 PROCEDURE — 85048 AUTOMATED LEUKOCYTE COUNT: CPT | Performed by: PSYCHIATRY & NEUROLOGY

## 2018-04-05 RX ORDER — ONDANSETRON 4 MG/1
4 TABLET, ORALLY DISINTEGRATING ORAL EVERY 6 HOURS PRN
Status: DISCONTINUED | OUTPATIENT
Start: 2018-04-05 | End: 2018-04-06 | Stop reason: HOSPADM

## 2018-04-05 RX ADMIN — LITHIUM CARBONATE 450 MG: 450 TABLET, EXTENDED RELEASE ORAL at 08:32

## 2018-04-05 RX ADMIN — TRAZODONE HYDROCHLORIDE 100 MG: 100 TABLET ORAL at 20:22

## 2018-04-05 RX ADMIN — CLOZAPINE 150 MG: 100 TABLET ORAL at 20:22

## 2018-04-05 RX ADMIN — VITAMIN D, TAB 1000IU (100/BT) 2000 UNITS: 25 TAB at 08:32

## 2018-04-05 RX ADMIN — ACETAMINOPHEN 650 MG: 325 TABLET ORAL at 13:54

## 2018-04-05 RX ADMIN — GABAPENTIN 300 MG: 300 CAPSULE ORAL at 08:32

## 2018-04-05 RX ADMIN — GABAPENTIN 300 MG: 300 CAPSULE ORAL at 20:22

## 2018-04-05 RX ADMIN — ONDANSETRON 4 MG: 4 TABLET, ORALLY DISINTEGRATING ORAL at 10:00

## 2018-04-05 RX ADMIN — ACETAMINOPHEN 650 MG: 325 TABLET ORAL at 20:22

## 2018-04-05 ASSESSMENT — ACTIVITIES OF DAILY LIVING (ADL)
GROOMING: HANDWASHING
DRESS: SCRUBS (BEHAVIORAL HEALTH)
ORAL_HYGIENE: INDEPENDENT
GROOMING: INDEPENDENT
LAUNDRY: UNABLE TO COMPLETE
ORAL_HYGIENE: INDEPENDENT
LAUNDRY: UNABLE TO COMPLETE
DRESS: INDEPENDENT

## 2018-04-05 NOTE — PROGRESS NOTES
Pt states she is going to request discharge tomorrow. She states that she feels safe and no longer suicidal.     Pt reports feeling physically ill. Chills   Denies pain.   Nausea with one small emesis. Declined zofran  Butter toast and ginger ale for supper.   Encouraging fluids  No BM- cdiff and enteric bacteria needs collecting    2010 T 102.6  2022 PRN tylenol 650 mg for fever.   2110 T 102.1    Notified Dr Lin, recommended calling house officer. Paged [phone]    Pt down for xray on gurramon. T 100.6 tymp/ hot to touch    Influenza test sent to lab

## 2018-04-05 NOTE — PLAN OF CARE
Problem: Depressive Symptoms  Goal: Depressive Symptoms  Signs and symptoms of listed problems will be absent or manageable.   Absense of SI.  Participate in structures activities.  Eat 50 to 75% of meals.  Sleep 6 to 8 hours at night.   Outcome: No Change  Patient stayed in room most of the time during the shift.  Flat affect, denies SI/SIB, hallucination.  Refused to eat breakfast/lunch due to c/o diarrhea and nausea.  Patient was offered some saltine crackers and soda.  Heart rate and temp was high this afternoon.  MD notified, was seeing by internal medicine.  Abdominal x-ray and EKG done per doctor's order.  Resting comfortably in room, will continue to monitor closely.

## 2018-04-05 NOTE — PROGRESS NOTES
[facility], [place]   Psychiatric Progress Note        Interim History:   Elma Huntley was admitted due to hearing voices urging her to kill herself. She OD on Trazodone.     The patient's care was discussed with the treatment team during the daily team meeting and/or staff's chart notes were reviewed.  Staff report patient has been calm, pleasant, cooperative.  Reports she is not hearing voices and is ready for discharge. However feels phasically ill. She has been spiking tempeture most of the day, had nausea, and diarrhea. Chect x-ray shows pneumonia.  Patient is isolative to room. Ate very little yesterday. Med compliant.  Slept 6.5 hours.     Met with patient. She is sitting in bed, smiling. States she is feeling much better. Denies nausea and diarrhea. Denies body pain and aches. Ate breakfast. Denies depression, anxiety, AH/VH. Wants to go home today, however is willing to stay in the hospital until pneumonia resolves.     UA is also positive for large amount of bacteria. UC is pending.   Clozaril level was ordered STAT.   Lithium level is 0.28.  ECG: Sinus tachycardia. Possible Left atrial enlargement, Nonspecific T wave abnormality.  Lab reviewed.         Medications:       cloZAPine  150 mg Oral At Bedtime     lithium  450 mg Oral Daily     cholecalciferol  2,000 Units Oral Daily     gabapentin (NEURONTIN) capsule 300 mg  300 mg Oral BID     traZODone (DESYREL) tablet 100 mg  100 mg Oral At Bedtime          Allergies:   No Known Allergies       Labs:     Recent Results (from the past 672 hour(s))   CBC with platelets differential    Collection Time: 03/24/18  1:39 PM   Result Value Ref Range    WBC 9.0 4.0 - 11.0 10e9/L    RBC Count 3.67 (L) 3.8 - 5.2 10e12/L    Hemoglobin 11.7 11.7 - 15.7 g/dL    Hematocrit 36.8 35.0 - 47.0 %     78 - 100 fl    MCH 31.9 26.5 - 33.0 pg    MCHC 31.8 31.5 - 36.5 g/dL    RDW 14.5 10.0 - 15.0 %    Platelet Count 278 150 - 450 10e9/L    Diff  Method Automated Method     % Neutrophils 84.3 %    % Lymphocytes 10.8 %    % Monocytes 4.0 %    % Eosinophils 0.1 %    % Basophils 0.2 %    % Immature Granulocytes 0.6 %    Nucleated RBCs 0 0 /100    Absolute Neutrophil 7.6 1.6 - 8.3 10e9/L    Absolute Lymphocytes 1.0 0.8 - 5.3 10e9/L    Absolute Monocytes 0.4 0.0 - 1.3 10e9/L    Absolute Eosinophils 0.0 0.0 - 0.7 10e9/L    Absolute Basophils 0.0 0.0 - 0.2 10e9/L    Abs Immature Granulocytes 0.1 0 - 0.4 10e9/L    Absolute Nucleated RBC 0.0    Vitamin B12    Collection Time: 03/25/18  7:30 AM   Result Value Ref Range    Vitamin B12 669 193 - 986 pg/mL   Folate    Collection Time: 03/25/18  7:30 AM   Result Value Ref Range    Folate 10.7 >5.4 ng/mL   Vitamin D    Collection Time: 03/25/18  7:30 AM   Result Value Ref Range    Vitamin D Deficiency screening 30 20 - 75 ug/L   Glucose by meter    Collection Time: 03/25/18  4:57 PM   Result Value Ref Range    Glucose 103 (H) 70 - 99 mg/dL   Glucose by meter    Collection Time: 03/26/18  7:46 AM   Result Value Ref Range    Glucose 119 (H) 70 - 99 mg/dL   Glucose by meter    Collection Time: 03/26/18  4:57 PM   Result Value Ref Range    Glucose 91 70 - 99 mg/dL   Lithium level    Collection Time: 03/27/18  7:43 AM   Result Value Ref Range    Lithium Level <0.20 (L) 0.60 - 1.20 mmol/L   Glucose by meter    Collection Time: 03/27/18  7:57 AM   Result Value Ref Range    Glucose 112 (H) 70 - 99 mg/dL   Glucose by meter    Collection Time: 03/28/18  8:05 AM   Result Value Ref Range    Glucose 117 (H) 70 - 99 mg/dL   Glucose by meter    Collection Time: 03/28/18  5:30 PM   Result Value Ref Range    Glucose 94 70 - 99 mg/dL   Glucose by meter    Collection Time: 03/29/18  7:53 AM   Result Value Ref Range    Glucose 112 (H) 70 - 99 mg/dL   Glucose by meter    Collection Time: 03/29/18  5:28 PM   Result Value Ref Range    Glucose 115 (H) 70 - 99 mg/dL   WBC and differential    Collection Time: 03/30/18  7:45 AM   Result Value Ref  Range    WBC 10.3 4.0 - 11.0 10e9/L    Diff Method Automated Method     % Neutrophils 79.0 %    % Lymphocytes 13.3 %    % Monocytes 6.1 %    % Eosinophils 1.0 %    % Basophils 0.2 %    % Immature Granulocytes 0.4 %    Nucleated RBCs 0 0 /100    Absolute Neutrophil 8.2 1.6 - 8.3 10e9/L    Absolute Lymphocytes 1.4 0.8 - 5.3 10e9/L    Absolute Monocytes 0.6 0.0 - 1.3 10e9/L    Absolute Eosinophils 0.1 0.0 - 0.7 10e9/L    Absolute Basophils 0.0 0.0 - 0.2 10e9/L    Abs Immature Granulocytes 0.0 0 - 0.4 10e9/L    Absolute Nucleated RBC 0.0    Lithium level    Collection Time: 03/30/18  7:45 AM   Result Value Ref Range    Lithium Level <0.20 (L) 0.60 - 1.20 mmol/L   Glucose by meter    Collection Time: 03/30/18  8:05 AM   Result Value Ref Range    Glucose 101 (H) 70 - 99 mg/dL   Glucose by meter    Collection Time: 03/30/18  5:14 PM   Result Value Ref Range    Glucose 100 (H) 70 - 99 mg/dL   Glucose by meter    Collection Time: 03/31/18  8:10 AM   Result Value Ref Range    Glucose 100 (H) 70 - 99 mg/dL   Glucose by meter    Collection Time: 03/31/18  5:19 PM   Result Value Ref Range    Glucose 97 70 - 99 mg/dL   Glucose by meter    Collection Time: 04/01/18  7:53 AM   Result Value Ref Range    Glucose 103 (H) 70 - 99 mg/dL   Glucose by meter    Collection Time: 04/01/18  5:20 PM   Result Value Ref Range    Glucose 99 70 - 99 mg/dL   Glucose by meter    Collection Time: 04/02/18  7:52 AM   Result Value Ref Range    Glucose 110 (H) 70 - 99 mg/dL   Glucose by meter    Collection Time: 04/02/18  4:44 PM   Result Value Ref Range    Glucose 99 70 - 99 mg/dL   Glucose by meter    Collection Time: 04/03/18  7:52 AM   Result Value Ref Range    Glucose 127 (H) 70 - 99 mg/dL   Glucose by meter    Collection Time: 04/03/18  5:12 PM   Result Value Ref Range    Glucose 111 (H) 70 - 99 mg/dL   UA with Microscopic reflex to Culture    Collection Time: 04/04/18  4:21 PM   Result Value Ref Range    Color Urine Yellow     Appearance Urine  Cloudy     Glucose Urine Negative NEG^Negative mg/dL    Bilirubin Urine Negative NEG^Negative    Ketones Urine Negative NEG^Negative mg/dL    Specific Gravity Urine 1.029 1.003 - 1.035    Blood Urine Small (A) NEG^Negative    pH Urine 5.5 5.0 - 7.0 pH    Protein Albumin Urine 30 (A) NEG^Negative mg/dL    Urobilinogen mg/dL Normal 0.0 - 2.0 mg/dL    Nitrite Urine Negative NEG^Negative    Leukocyte Esterase Urine Negative NEG^Negative    Source Midstream Urine     WBC Urine 0 0 - 5 /HPF    RBC Urine 3 (H) 0 - 2 /HPF    Bacteria Urine Moderate (A) NEG^Negative /HPF    Mucous Urine Present (A) NEG^Negative /LPF    Amorphous Crystals Few (A) NEG^Negative /HPF   WBC and differential    Collection Time: 04/05/18  8:22 AM   Result Value Ref Range    WBC 5.4 4.0 - 11.0 10e9/L    Diff Method Automated Method     % Neutrophils 70.4 %    % Lymphocytes 13.4 %    % Monocytes 9.7 %    % Eosinophils 6.1 %    % Basophils 0.2 %    % Immature Granulocytes 0.2 %    Nucleated RBCs 0 0 /100    Absolute Neutrophil 3.8 1.6 - 8.3 10e9/L    Absolute Lymphocytes 0.7 (L) 0.8 - 5.3 10e9/L    Absolute Monocytes 0.5 0.0 - 1.3 10e9/L    Absolute Eosinophils 0.3 0.0 - 0.7 10e9/L    Absolute Basophils 0.0 0.0 - 0.2 10e9/L    Abs Immature Granulocytes 0.0 0 - 0.4 10e9/L    Absolute Nucleated RBC 0.0             Psychiatric Examination:   Temp: 99.5  F (37.5  C) Temp src: Tympanic       Resp: 16 SpO2: 92 %      Weight is 141 lbs 4.8 oz  Body mass index is 22.13 kg/(m^2).    Appearance: awake, alert, well groomed, awake, alert and cooperative  Attitude:  cooperative  Eye Contact:  good  Mood:  anxious and better  Affect:  appropriate and in normal range  Speech:  clear, coherent  Psychomotor Behavior:  no evidence of tardive dyskinesia, dystonia, or tics  Throught Process:  linear and goal oriented  Associations:  no loose associations  Thought Content:  no evidence of suicidal ideation or homicidal ideation and does not a.ppear responding to  hallucinations.   Insight:  good  Judgement:  intact  Oriented to:  time, person, and place  Attention Span and Concentration:  intact  Recent and Remote Memory:  intact         Precautions:     Behavioral Orders   Procedures     Code 1 - Restrict to Unit     Routine Programming     As clinically indicated          DIagnoses:     Major depression, recurrent, severe with psychotic features.          Plan:   1. Decrease Clozaril to 100 mg, qhs  2. Gabapentin 300 mg, bid  3. Trazodone 100 mg, qhs  4. Lithium 450 mg, qhs  5. PRN Zyprexa, Trazodone, Hydroxyzine  6. IM to follow up for medical problems.  7. CBC with diff, Lithium level, CMP, TSH with T4, Clozaril level on 4/6/18. ECG.  8. Disposition: likely to home  9. Care was coordinated with the treatment team.     Sumi MATA CNP  Date: 04/06/18  Time: 2:15 PM

## 2018-04-05 NOTE — PROGRESS NOTES
Patient was found incontinent of stool in bed. Claimed that she had diarrhea, but actually she had soft stools. Slept the whole shift.

## 2018-04-06 ENCOUNTER — HOSPITAL ENCOUNTER (INPATIENT)
Facility: CLINIC | Age: 63
LOS: 4 days | Discharge: HOME | End: 2018-04-11
Attending: INTERNAL MEDICINE | Admitting: INTERNAL MEDICINE
Payer: COMMERCIAL

## 2018-04-06 VITALS
RESPIRATION RATE: 16 BRPM | DIASTOLIC BLOOD PRESSURE: 67 MMHG | HEART RATE: 110 BPM | WEIGHT: 141.3 LBS | OXYGEN SATURATION: 95 % | SYSTOLIC BLOOD PRESSURE: 109 MMHG | BODY MASS INDEX: 22.13 KG/M2 | TEMPERATURE: 99.9 F

## 2018-04-06 DIAGNOSIS — F33.3 SEVERE RECURRENT MAJOR DEPRESSION WITH PSYCHOTIC FEATURES (H): ICD-10-CM

## 2018-04-06 DIAGNOSIS — E55.9 VITAMIN D DEFICIENCY: ICD-10-CM

## 2018-04-06 LAB
ALBUMIN UR-MCNC: 30 MG/DL
APPEARANCE UR: ABNORMAL
BACTERIA #/AREA URNS HPF: ABNORMAL /HPF
BASOPHILS # BLD AUTO: 0 10E9/L (ref 0–0.2)
BASOPHILS NFR BLD AUTO: 0 %
BILIRUB UR QL STRIP: NEGATIVE
CAOX CRY #/AREA URNS HPF: ABNORMAL /HPF
COLOR UR AUTO: YELLOW
CRP SERPL-MCNC: 62.2 MG/L (ref 0–8)
DIFFERENTIAL METHOD BLD: ABNORMAL
EOSINOPHIL # BLD AUTO: 0.3 10E9/L (ref 0–0.7)
EOSINOPHIL NFR BLD AUTO: 6.2 %
ERYTHROCYTE [DISTWIDTH] IN BLOOD BY AUTOMATED COUNT: 14.2 % (ref 10–15)
FLUAV+FLUBV RNA SPEC QL NAA+PROBE: NEGATIVE
FLUAV+FLUBV RNA SPEC QL NAA+PROBE: NEGATIVE
GLUCOSE UR STRIP-MCNC: NEGATIVE MG/DL
HCT VFR BLD AUTO: 37.1 % (ref 35–47)
HGB BLD-MCNC: 11.8 G/DL (ref 11.7–15.7)
HGB UR QL STRIP: ABNORMAL
IMM GRANULOCYTES # BLD: 0 10E9/L (ref 0–0.4)
IMM GRANULOCYTES NFR BLD: 0.5 %
INTERPRETATION ECG - MUSE: NORMAL
KETONES UR STRIP-MCNC: NEGATIVE MG/DL
LACTATE BLD-SCNC: 2.5 MMOL/L (ref 0.7–2)
LEUKOCYTE ESTERASE UR QL STRIP: NEGATIVE
LITHIUM SERPL-SCNC: 0.28 MMOL/L (ref 0.6–1.2)
LYMPHOCYTES # BLD AUTO: 0.5 10E9/L (ref 0.8–5.3)
LYMPHOCYTES NFR BLD AUTO: 12.7 %
MCH RBC QN AUTO: 31.2 PG (ref 26.5–33)
MCHC RBC AUTO-ENTMCNC: 31.8 G/DL (ref 31.5–36.5)
MCV RBC AUTO: 98 FL (ref 78–100)
MONOCYTES # BLD AUTO: 0.2 10E9/L (ref 0–1.3)
MONOCYTES NFR BLD AUTO: 5.7 %
MUCOUS THREADS #/AREA URNS LPF: PRESENT /LPF
NEUTROPHILS # BLD AUTO: 3 10E9/L (ref 1.6–8.3)
NEUTROPHILS NFR BLD AUTO: 74.9 %
NITRATE UR QL: NEGATIVE
NRBC # BLD AUTO: 0 10*3/UL
NRBC BLD AUTO-RTO: 0 /100
PH UR STRIP: 5.5 PH (ref 5–7)
PLATELET # BLD AUTO: 197 10E9/L (ref 150–450)
PROCALCITONIN SERPL-MCNC: 0.34 NG/ML
RBC # BLD AUTO: 3.78 10E12/L (ref 3.8–5.2)
RBC #/AREA URNS AUTO: 3 /HPF (ref 0–2)
RSV RNA SPEC NAA+PROBE: NEGATIVE
SOURCE: ABNORMAL
SP GR UR STRIP: 1.03 (ref 1–1.03)
SPECIMEN SOURCE: NORMAL
TSH SERPL DL<=0.005 MIU/L-ACNC: 2.89 MU/L (ref 0.4–4)
UROBILINOGEN UR STRIP-MCNC: NORMAL MG/DL (ref 0–2)
WBC # BLD AUTO: 4 10E9/L (ref 4–11)
WBC #/AREA URNS AUTO: 1 /HPF (ref 0–5)

## 2018-04-06 PROCEDURE — 80178 ASSAY OF LITHIUM: CPT | Performed by: NURSE PRACTITIONER

## 2018-04-06 PROCEDURE — 25000128 H RX IP 250 OP 636: Performed by: NURSE PRACTITIONER

## 2018-04-06 PROCEDURE — 85004 AUTOMATED DIFF WBC COUNT: CPT | Performed by: NURSE PRACTITIONER

## 2018-04-06 PROCEDURE — 99221 1ST HOSP IP/OBS SF/LOW 40: CPT | Mod: AI | Performed by: INTERNAL MEDICINE

## 2018-04-06 PROCEDURE — 83605 ASSAY OF LACTIC ACID: CPT | Performed by: NURSE PRACTITIONER

## 2018-04-06 PROCEDURE — 25000128 H RX IP 250 OP 636: Performed by: SPECIALIST

## 2018-04-06 PROCEDURE — 36415 COLL VENOUS BLD VENIPUNCTURE: CPT | Performed by: NURSE PRACTITIONER

## 2018-04-06 PROCEDURE — 86140 C-REACTIVE PROTEIN: CPT | Performed by: NURSE PRACTITIONER

## 2018-04-06 PROCEDURE — 87086 URINE CULTURE/COLONY COUNT: CPT | Performed by: PSYCHIATRY & NEUROLOGY

## 2018-04-06 PROCEDURE — 25000132 ZZH RX MED GY IP 250 OP 250 PS 637: Performed by: NURSE PRACTITIONER

## 2018-04-06 PROCEDURE — 99207 ZZC CDG-HISTORY COMP: MEETS EXP. PROBLEM FOCUSED-DOWN CODED LACK OF ROS: CPT | Performed by: INTERNAL MEDICINE

## 2018-04-06 PROCEDURE — 84145 PROCALCITONIN (PCT): CPT | Performed by: NURSE PRACTITIONER

## 2018-04-06 PROCEDURE — 81001 URINALYSIS AUTO W/SCOPE: CPT | Performed by: NURSE PRACTITIONER

## 2018-04-06 PROCEDURE — 84443 ASSAY THYROID STIM HORMONE: CPT | Performed by: NURSE PRACTITIONER

## 2018-04-06 PROCEDURE — 80159 DRUG ASSAY CLOZAPINE: CPT | Performed by: NURSE PRACTITIONER

## 2018-04-06 PROCEDURE — 25000132 ZZH RX MED GY IP 250 OP 250 PS 637: Performed by: PSYCHIATRY & NEUROLOGY

## 2018-04-06 PROCEDURE — 85025 COMPLETE CBC W/AUTO DIFF WBC: CPT | Performed by: NURSE PRACTITIONER

## 2018-04-06 RX ORDER — GUAIFENESIN 600 MG/1
600 TABLET, EXTENDED RELEASE ORAL 2 TIMES DAILY
Status: DISCONTINUED | OUTPATIENT
Start: 2018-04-06 | End: 2018-04-06 | Stop reason: HOSPADM

## 2018-04-06 RX ORDER — ONDANSETRON 2 MG/ML
4 INJECTION INTRAMUSCULAR; INTRAVENOUS EVERY 6 HOURS PRN
Status: CANCELLED | OUTPATIENT
Start: 2018-04-06

## 2018-04-06 RX ORDER — ONDANSETRON 4 MG/1
4 TABLET, ORALLY DISINTEGRATING ORAL EVERY 6 HOURS PRN
Status: CANCELLED | OUTPATIENT
Start: 2018-04-06

## 2018-04-06 RX ORDER — CEFTRIAXONE 1 G/1
1 INJECTION, POWDER, FOR SOLUTION INTRAMUSCULAR; INTRAVENOUS EVERY 24 HOURS
Status: DISCONTINUED | OUTPATIENT
Start: 2018-04-06 | End: 2018-04-06 | Stop reason: HOSPADM

## 2018-04-06 RX ORDER — AZITHROMYCIN 250 MG/1
250 TABLET, FILM COATED ORAL DAILY
Status: DISCONTINUED | OUTPATIENT
Start: 2018-04-07 | End: 2018-04-06 | Stop reason: HOSPADM

## 2018-04-06 RX ORDER — SODIUM CHLORIDE 9 MG/ML
INJECTION, SOLUTION INTRAVENOUS CONTINUOUS
Status: CANCELLED | OUTPATIENT
Start: 2018-04-06 | End: 2018-04-07

## 2018-04-06 RX ORDER — CLOZAPINE 100 MG/1
100 TABLET ORAL AT BEDTIME
Status: DISCONTINUED | OUTPATIENT
Start: 2018-04-06 | End: 2018-04-06 | Stop reason: HOSPADM

## 2018-04-06 RX ORDER — SODIUM CHLORIDE 9 MG/ML
INJECTION, SOLUTION INTRAVENOUS CONTINUOUS
Status: DISCONTINUED | OUTPATIENT
Start: 2018-04-07 | End: 2018-04-06 | Stop reason: HOSPADM

## 2018-04-06 RX ORDER — ACETAMINOPHEN 325 MG/1
650 TABLET ORAL EVERY 4 HOURS PRN
Status: CANCELLED | OUTPATIENT
Start: 2018-04-06

## 2018-04-06 RX ADMIN — CLOZAPINE 100 MG: 100 TABLET ORAL at 21:30

## 2018-04-06 RX ADMIN — LITHIUM CARBONATE 450 MG: 450 TABLET, EXTENDED RELEASE ORAL at 09:55

## 2018-04-06 RX ADMIN — ACETAMINOPHEN 650 MG: 325 TABLET ORAL at 18:32

## 2018-04-06 RX ADMIN — GUAIFENESIN 600 MG: 600 TABLET, EXTENDED RELEASE ORAL at 21:29

## 2018-04-06 RX ADMIN — AZITHROMYCIN MONOHYDRATE 500 MG: 500 INJECTION, POWDER, LYOPHILIZED, FOR SOLUTION INTRAVENOUS at 01:35

## 2018-04-06 RX ADMIN — GABAPENTIN 300 MG: 300 CAPSULE ORAL at 09:55

## 2018-04-06 RX ADMIN — CEFTRIAXONE SODIUM 1 G: 1 INJECTION, POWDER, FOR SOLUTION INTRAMUSCULAR; INTRAVENOUS at 01:29

## 2018-04-06 RX ADMIN — ACETAMINOPHEN 650 MG: 325 TABLET ORAL at 00:35

## 2018-04-06 RX ADMIN — ACETAMINOPHEN 650 MG: 325 TABLET ORAL at 23:10

## 2018-04-06 RX ADMIN — GABAPENTIN 300 MG: 300 CAPSULE ORAL at 21:30

## 2018-04-06 RX ADMIN — TRAZODONE HYDROCHLORIDE 100 MG: 100 TABLET ORAL at 21:30

## 2018-04-06 RX ADMIN — ACETAMINOPHEN 650 MG: 325 TABLET ORAL at 09:54

## 2018-04-06 RX ADMIN — SODIUM CHLORIDE 1000 ML: 9 INJECTION, SOLUTION INTRAVENOUS at 17:45

## 2018-04-06 RX ADMIN — VITAMIN D, TAB 1000IU (100/BT) 2000 UNITS: 25 TAB at 09:54

## 2018-04-06 ASSESSMENT — ACTIVITIES OF DAILY LIVING (ADL)
LAUNDRY: UNABLE TO COMPLETE
DRESS: INDEPENDENT;SCRUBS (BEHAVIORAL HEALTH)
ORAL_HYGIENE: INDEPENDENT
DRESS: INDEPENDENT
GROOMING: INDEPENDENT
ORAL_HYGIENE: INDEPENDENT
GROOMING: INDEPENDENT

## 2018-04-06 NOTE — PLAN OF CARE
Problem: Depressive Symptoms  Goal: Depressive Symptoms  Signs and symptoms of listed problems will be absent or manageable.   Absense of SI.  Participate in structures activities.  Eat 50 to 75% of meals.  Sleep 6 to 8 hours at night.     Did not attend OT groups today.

## 2018-04-06 NOTE — PROGRESS NOTES
"Pt was up walking some at the start of the shift. Pt states she is still feeling, \"kind of lousy.\" Writer had asked pt about her IV saline locked, pt states, \"oh I took it out, I didn't know I need it.\" Writer disposed of the IV insertion piece. Pt's vitals at 16:00 BP (125/75, 123/78) HR (117, 126) RR (16) O2 (96% on room air) T (100.8- oral). Writer called  (Jessie- Nurse Practitioner) and informed her of pt's current status and her elevated heart rate.     New orders were placed. Savana LOWERY (med flyer) started pt's IV and Bolus was hung; ordered to run for 2 hours (ending at 19:45) Pt is on a SIO while IV is running. Ordered received by Dr. Chester.     Charge BEVERLEY Miranda and writer have been in contact with  (Jessie- Nurse Practitioner).     Rechecked vitals at 18:30 T (102.5 oral) RR (16) HR (118, 116) BP (143/81) O2 (96% on room air). Pt received Tylenol PRN- 650 mg. Pt states she is continuing to feel, \"lousy.\" Pt did eat some of her dinner; 25% and has been taking in fluids.     Dr. Lin was paged and the hospitalist was paged per Charge BEVERLEY Miranda.    Rechecked vitals at 19:28 T (101.7- oral) RR (16) HR (106) BP (106/54) O2 (95% on room air). Pt feels warm to the touch. She states, \"I am hot and cold.\" Continues to rest in bed. Will continue to monitor for symptoms of fluid overload.      Hopsitalist came to assess pt. Order placed for pt to be transferred to medical.     Rechecked vitals at 21:20 T (99.9- oral) RR (16) HR (107, 110) BP (123/70, 109/67) O2 (95% on room air). Pt continues to take in fluids and appears more comfortable.     Output this evening 900cc. No c/o of urinary rentention or pain when urinating.    Vitals taken again at 23:06 T (102.0- tympanic) BP (145/80) HR (98) RR (16) O2 (95% on room air). PRN Tylenol 650 mg was given prior to transfer at 23:10.     8A called for report at 22:26- Writer gave report to BEVERLEY Saez- on 8A. Pt was transferred at 23:17, Silvia LOWERY brought pt over to " 8A. Pt denies any SI/SIB. She states she is frustrated about being ill. No other concerns stated prior to discharging from .

## 2018-04-06 NOTE — PROGRESS NOTES
0030: Vital signs taken. BP-97/60, P-98, R-16, T-99.5 and O2 sat-94% @ R.A.  Skin very warm to touch. Tylenol 650 mg. given PRN for fever.     0100: Rocephin 1 gm and Zithromax  500 mg given thru IV in 100 ml. NaCl 0.9% intermittent infusion. IV line kept in place after the infusion was completed.     0500: BP-98/58, P-97. R-16,  T-97.9 and O2 sat-93% @ RA.  No bowel movement noted. Patient slept the whole shift.

## 2018-04-06 NOTE — PROGRESS NOTES
Cross coverage    Fevers on off all day   BC neg so far  Ordered influenza PCR   CXR showed pneumonia  Started on IV ceftriaxone and Zithromax     Dilia Webber

## 2018-04-06 NOTE — IP AVS SNAPSHOT
MRN:[id number]                      After Visit Summary   4/6/2018    Elma Huntley    MRN: [id number]           Thank you!     Thank you for choosing [facility] for your care. Our goal is always to provide you with excellent care. Hearing back from our patients is one way we can continue to improve our services. Please take a few minutes to complete the written survey that you may receive in the mail after you visit with us. Thank you!        Patient Information     Date Of Birth          1955        Designated Caregiver       Most Recent Value    Caregiver    Will someone help with your care after discharge? yes    Name of designated caregiver Rajat    Phone number of caregiver [phone]    Caregiver address [address]      About your hospital stay     You were admitted on:  April 6, 2018 You last received care in the:  [facility]    You were discharged on:  April 11, 2018        Reason for your hospital stay       You were hospitalized on the medical unit for fever and diarrhea.  You previously had been hospitalized on the [facility] mental health unit                  Who to Call     For medical emergencies, please call 911.  For non-urgent questions about your medical care, please call your primary care provider or clinic, None          Attending Provider     Provider Specialty    Bry Siu MD Internal Medicine       Primary Care Provider Fax #    Physician No Ref-Primary [phone]      After Care Instructions     Diet       Follow this diet upon discharge: Orders Placed This Encounter      Combination Diet Regular Diet Adult                  Follow-up Appointments     Adult [facility] Follow-up and recommended labs and tests       Follow up [facility], [address] [place], [place] [address]  Phone: [phone], on April 19 at 12:20pm, to evaluate medication change.  No follow up labs or test are needed.                  Pending Results   "   No orders found from 2018 to 2018.            Statement of Approval     Ordered          18 0732  I have reviewed and agree with all the recommendations and orders detailed in this document.  EFFECTIVE NOW     Approved and electronically signed by:  Ramon Medina MD           18 0719  I have reviewed and agree with all the recommendations and orders detailed in this document.     Approved and electronically signed by:  Ramon Medina MD             Admission Information     Date & Time Provider Department Dept. Phone    2018 Bry Siu MD  8A [phone]      Your Vitals Were     Blood Pressure Pulse Temperature Respirations Pulse Oximetry       104/63 (BP Location: Right arm) 100 98.2  F (36.8  C) (Oral) 16 92%       MyChart Information     [employer] lets you send messages to your doctor, view your test results, renew your prescriptions, schedule appointments and more. To sign up, go to [email or web address] . Click on \"Log in\" on the left side of the screen, which will take you to the Welcome page. Then click on \"Sign up Now\" on the right side of the page.     You will be asked to enter the access code listed below, as well as some personal information. Please follow the directions to create your username and password.     Your access code is: [email or web address]  Expires: 7/10/2018  7:42 AM     Your access code will  in 90 days. If you need help or a new code, please call your [facility] clinic or [phone].        Care EveryWhere ID     This is your Care EveryWhere ID. This could be used by other organizations to access your [facility] medical records  [id number]        Equal Access to Services     [facility]: Hadii giles Knight, wajannieda luoracioadaha, qaybta mayur cooley, tosha sumner . So [facility] [phone].    ATENCIÓN: Si habla español, tiene a gan disposición servicios gratuitos de asistencia lingüística. Llame al " [phone].    We comply with applicable federal civil rights laws and [place] laws. We do not discriminate on the basis of race, color, national origin, age, disability, sex, sexual orientation, or gender identity.               Review of your medicines      CONTINUE these medicines which may have CHANGED, or have new prescriptions. If we are uncertain of the size of tablets/capsules you have at home, strength may be listed as something that might have changed.        Dose / Directions    cloZAPine 50 MG tablet   Commonly known as:  CLOZARIL   This may have changed:  how much to take   Used for:  Severe recurrent major depression with psychotic features (H)        Dose:  100 mg   Take 2 tablets (100 mg) by mouth At Bedtime   Quantity:  14 tablet   Refills:  0       traZODone 100 MG tablet   Commonly known as:  DESYREL   This may have changed:  Another medication with the same name was removed. Continue taking this medication, and follow the directions you see here.   Used for:  Severe recurrent major depression with psychotic features (H)        Dose:  100 mg   Take 1 tablet (100 mg) by mouth At Bedtime   Quantity:  30 tablet   Refills:  3         CONTINUE these medicines which have NOT CHANGED        Dose / Directions    cholecalciferol 2000 units tablet   Used for:  Vitamin D deficiency        Dose:  2000 Units   Take 2,000 Units by mouth daily   Quantity:  90 tablet   Refills:  3       gabapentin 300 MG capsule   Commonly known as:  NEURONTIN   Used for:  Severe recurrent major depression with psychotic features (H)        Dose:  300 mg   Take 1 capsule (300 mg) by mouth 2 times daily   Quantity:  60 capsule   Refills:  3       lithium 450 MG CR tablet   Commonly known as:  ESKALITH   Used for:  Severe recurrent major depression with psychotic features (H)        Dose:  450 mg   Take 1 tablet (450 mg) by mouth daily   Quantity:  30 tablet   Refills:  3            Where to get your medicines      These  medications were sent to [facility] [place], [place] - [address]  [address] [place] [address], [facility] [address]     Phone:  [phone]     cholecalciferol 2000 units tablet    cloZAPine 50 MG tablet    gabapentin 300 MG capsule    lithium 450 MG CR tablet    traZODone 100 MG tablet                Protect others around you: Learn how to safely use, store and throw away your medicines at www.disposemymeds.org.             Medication List: This is a list of all your medications and when to take them. Check marks below indicate your daily home schedule. Keep this list as a reference.      Medications           Morning Afternoon Evening Bedtime As Needed    cholecalciferol 2000 units tablet   Take 2,000 Units by mouth daily                                cloZAPine 50 MG tablet   Commonly known as:  CLOZARIL   Take 2 tablets (100 mg) by mouth At Bedtime   Last time this was given:  100 mg on 4/10/2018  9:34 PM                                gabapentin 300 MG capsule   Commonly known as:  NEURONTIN   Take 1 capsule (300 mg) by mouth 2 times daily   Last time this was given:  300 mg on 4/11/2018  7:59 AM                                lithium 450 MG CR tablet   Commonly known as:  ESKALITH   Take 1 tablet (450 mg) by mouth daily   Last time this was given:  450 mg on 4/11/2018  7:59 AM                                traZODone 100 MG tablet   Commonly known as:  DESYREL   Take 1 tablet (100 mg) by mouth At Bedtime   Last time this was given:  100 mg on 4/10/2018  9:37 PM

## 2018-04-06 NOTE — IP AVS SNAPSHOT
[facility]    [address]    [place] [address]    Phone:  [phone]                                       After Visit Summary   4/6/2018    Elma Huntley    MRN: [id number]           After Visit Summary Signature Page     I have received my discharge instructions, and my questions have been answered. I have discussed any challenges I see with this plan with the nurse or doctor.    ..........................................................................................................................................  Patient/Patient Representative Signature      ..........................................................................................................................................  Patient Representative Print Name and Relationship to Patient    ..................................................               ................................................  Date                                            Time    ..........................................................................................................................................  Reviewed by Signature/Title    ...................................................              ..............................................  Date                                                            Time

## 2018-04-06 NOTE — PLAN OF CARE
Problem: Depressive Symptoms  Goal: Depressive Symptoms  Signs and symptoms of listed problems will be absent or manageable.   Absense of SI.  Participate in structures activities.  Eat 50 to 75% of meals.  Sleep 6 to 8 hours at night.   Outcome: Improving  48 HOUR NURSING ASSESSMENT:

## 2018-04-06 NOTE — PROGRESS NOTES
Spoke with Margy Antonio from  regarding pt monitoring.  Will monitor for symptoms of fluid overload after IV fluid bolus.      Pt may leave room but should wear mask for infection control.      Continue to monitor HR, temp, resp symptoms.

## 2018-04-06 NOTE — PROGRESS NOTES
"0830- Pt was approached for check in. Pt sitting up in her bed. Pt reports that she is feeling \"Much better\". Pt will be allowed to eat in her room as she is currently on enteric precautions. Pt well at breakfast and denied nausea. Pt has been encouraged to drink extra fluids. Pt requested/recieved 240 cc of diet lemon lime soda. Pt aware that she needs to stay in her room due to enteric precautions.   Pt voided 400 cc of dark nader urine. UA was sent  Pt denies SI/SIB.   Pt does report that her energy level is low.   "

## 2018-04-07 ENCOUNTER — APPOINTMENT (OUTPATIENT)
Dept: CT IMAGING | Facility: CLINIC | Age: 63
End: 2018-04-07
Attending: INTERNAL MEDICINE
Payer: COMMERCIAL

## 2018-04-07 PROBLEM — J18.9 PNEUMONIA: Status: ACTIVE | Noted: 2018-04-07

## 2018-04-07 LAB
ANION GAP SERPL CALCULATED.3IONS-SCNC: 6 MMOL/L (ref 3–14)
BACTERIA SPEC CULT: NORMAL
BASOPHILS # BLD AUTO: 0 10E9/L (ref 0–0.2)
BASOPHILS NFR BLD AUTO: 0.2 %
BUN SERPL-MCNC: 5 MG/DL (ref 7–30)
CALCIUM SERPL-MCNC: 7.9 MG/DL (ref 8.5–10.1)
CHLORIDE SERPL-SCNC: 109 MMOL/L (ref 94–109)
CO2 SERPL-SCNC: 25 MMOL/L (ref 20–32)
CREAT SERPL-MCNC: 0.41 MG/DL (ref 0.52–1.04)
DIFFERENTIAL METHOD BLD: ABNORMAL
EOSINOPHIL # BLD AUTO: 0.3 10E9/L (ref 0–0.7)
EOSINOPHIL NFR BLD AUTO: 6.1 %
ERYTHROCYTE [DISTWIDTH] IN BLOOD BY AUTOMATED COUNT: 14.2 % (ref 10–15)
GFR SERPL CREATININE-BSD FRML MDRD: >90 ML/MIN/1.7M2
GLUCOSE SERPL-MCNC: 103 MG/DL (ref 70–99)
HCT VFR BLD AUTO: 36.7 % (ref 35–47)
HGB BLD-MCNC: 11.6 G/DL (ref 11.7–15.7)
IMM GRANULOCYTES # BLD: 0 10E9/L (ref 0–0.4)
IMM GRANULOCYTES NFR BLD: 0.9 %
LACTATE BLD-SCNC: 0.5 MMOL/L (ref 0.7–2)
LACTATE BLD-SCNC: 0.7 MMOL/L (ref 0.4–1.9)
LYMPHOCYTES # BLD AUTO: 0.7 10E9/L (ref 0.8–5.3)
LYMPHOCYTES NFR BLD AUTO: 16.1 %
Lab: NORMAL
MAGNESIUM SERPL-MCNC: 1.8 MG/DL (ref 1.6–2.3)
MCH RBC QN AUTO: 31.1 PG (ref 26.5–33)
MCHC RBC AUTO-ENTMCNC: 31.6 G/DL (ref 31.5–36.5)
MCV RBC AUTO: 98 FL (ref 78–100)
MONOCYTES # BLD AUTO: 0.3 10E9/L (ref 0–1.3)
MONOCYTES NFR BLD AUTO: 7.2 %
NEUTROPHILS # BLD AUTO: 3.2 10E9/L (ref 1.6–8.3)
NEUTROPHILS NFR BLD AUTO: 69.5 %
NRBC # BLD AUTO: 0 10*3/UL
NRBC BLD AUTO-RTO: 0 /100
PLATELET # BLD AUTO: 158 10E9/L (ref 150–450)
POTASSIUM SERPL-SCNC: 3.2 MMOL/L (ref 3.4–5.3)
RBC # BLD AUTO: 3.73 10E12/L (ref 3.8–5.2)
SODIUM SERPL-SCNC: 140 MMOL/L (ref 133–144)
SPECIMEN SOURCE: NORMAL
WBC # BLD AUTO: 4.6 10E9/L (ref 4–11)

## 2018-04-07 PROCEDURE — 71250 CT THORAX DX C-: CPT

## 2018-04-07 PROCEDURE — 85025 COMPLETE CBC W/AUTO DIFF WBC: CPT | Performed by: INTERNAL MEDICINE

## 2018-04-07 PROCEDURE — 83605 ASSAY OF LACTIC ACID: CPT | Performed by: INTERNAL MEDICINE

## 2018-04-07 PROCEDURE — 83735 ASSAY OF MAGNESIUM: CPT | Performed by: INTERNAL MEDICINE

## 2018-04-07 PROCEDURE — 25000132 ZZH RX MED GY IP 250 OP 250 PS 637: Performed by: INTERNAL MEDICINE

## 2018-04-07 PROCEDURE — 25000128 H RX IP 250 OP 636: Performed by: INTERNAL MEDICINE

## 2018-04-07 PROCEDURE — 80048 BASIC METABOLIC PNL TOTAL CA: CPT | Performed by: INTERNAL MEDICINE

## 2018-04-07 PROCEDURE — 36415 COLL VENOUS BLD VENIPUNCTURE: CPT | Performed by: INTERNAL MEDICINE

## 2018-04-07 PROCEDURE — 12000001 ZZH R&B MED SURG/OB UMMC

## 2018-04-07 RX ORDER — TRAZODONE HYDROCHLORIDE 100 MG/1
100 TABLET ORAL AT BEDTIME
Status: DISCONTINUED | OUTPATIENT
Start: 2018-04-07 | End: 2018-04-11 | Stop reason: HOSPADM

## 2018-04-07 RX ORDER — CLOZAPINE 100 MG/1
100 TABLET ORAL AT BEDTIME
Status: DISCONTINUED | OUTPATIENT
Start: 2018-04-07 | End: 2018-04-11 | Stop reason: HOSPADM

## 2018-04-07 RX ORDER — LITHIUM CARBONATE 450 MG
450 TABLET, EXTENDED RELEASE ORAL DAILY
Status: DISCONTINUED | OUTPATIENT
Start: 2018-04-07 | End: 2018-04-11 | Stop reason: HOSPADM

## 2018-04-07 RX ORDER — ACETAMINOPHEN 325 MG/1
650 TABLET ORAL EVERY 4 HOURS PRN
Status: DISCONTINUED | OUTPATIENT
Start: 2018-04-07 | End: 2018-04-11 | Stop reason: HOSPADM

## 2018-04-07 RX ORDER — HYDROXYZINE HYDROCHLORIDE 50 MG/1
50 TABLET, FILM COATED ORAL EVERY 6 HOURS PRN
Status: DISCONTINUED | OUTPATIENT
Start: 2018-04-07 | End: 2018-04-11 | Stop reason: HOSPADM

## 2018-04-07 RX ORDER — GABAPENTIN 300 MG/1
300 CAPSULE ORAL 2 TIMES DAILY
Status: DISCONTINUED | OUTPATIENT
Start: 2018-04-07 | End: 2018-04-11 | Stop reason: HOSPADM

## 2018-04-07 RX ORDER — CLOZAPINE 100 MG/1
100 TABLET ORAL AT BEDTIME
Status: DISCONTINUED | OUTPATIENT
Start: 2018-04-08 | End: 2018-04-07

## 2018-04-07 RX ORDER — NALOXONE HYDROCHLORIDE 0.4 MG/ML
.1-.4 INJECTION, SOLUTION INTRAMUSCULAR; INTRAVENOUS; SUBCUTANEOUS
Status: DISCONTINUED | OUTPATIENT
Start: 2018-04-07 | End: 2018-04-11 | Stop reason: HOSPADM

## 2018-04-07 RX ORDER — POTASSIUM CHLORIDE 750 MG/1
40 TABLET, EXTENDED RELEASE ORAL ONCE
Status: COMPLETED | OUTPATIENT
Start: 2018-04-07 | End: 2018-04-07

## 2018-04-07 RX ORDER — AZITHROMYCIN 250 MG/1
250 TABLET, FILM COATED ORAL DAILY
Status: DISCONTINUED | OUTPATIENT
Start: 2018-04-07 | End: 2018-04-08

## 2018-04-07 RX ORDER — TRAZODONE HYDROCHLORIDE 50 MG/1
50 TABLET, FILM COATED ORAL
Status: DISCONTINUED | OUTPATIENT
Start: 2018-04-07 | End: 2018-04-11 | Stop reason: HOSPADM

## 2018-04-07 RX ORDER — CEFTRIAXONE 1 G/1
1 INJECTION, POWDER, FOR SOLUTION INTRAMUSCULAR; INTRAVENOUS EVERY 24 HOURS
Status: DISCONTINUED | OUTPATIENT
Start: 2018-04-07 | End: 2018-04-08

## 2018-04-07 RX ORDER — OLANZAPINE 2.5 MG/1
10 TABLET, FILM COATED ORAL EVERY 6 HOURS PRN
Status: DISCONTINUED | OUTPATIENT
Start: 2018-04-07 | End: 2018-04-11 | Stop reason: HOSPADM

## 2018-04-07 RX ORDER — SODIUM CHLORIDE 9 MG/ML
INJECTION, SOLUTION INTRAVENOUS CONTINUOUS
Status: DISCONTINUED | OUTPATIENT
Start: 2018-04-07 | End: 2018-04-11 | Stop reason: HOSPADM

## 2018-04-07 RX ADMIN — SODIUM CHLORIDE: 9 INJECTION, SOLUTION INTRAVENOUS at 02:16

## 2018-04-07 RX ADMIN — CEFTRIAXONE SODIUM 1 G: 1 INJECTION, POWDER, FOR SOLUTION INTRAMUSCULAR; INTRAVENOUS at 02:13

## 2018-04-07 RX ADMIN — TRAZODONE HYDROCHLORIDE 100 MG: 100 TABLET ORAL at 22:48

## 2018-04-07 RX ADMIN — ACETAMINOPHEN 650 MG: 325 TABLET ORAL at 14:46

## 2018-04-07 RX ADMIN — LITHIUM CARBONATE 450 MG: 450 TABLET, EXTENDED RELEASE ORAL at 08:35

## 2018-04-07 RX ADMIN — GABAPENTIN 300 MG: 300 CAPSULE ORAL at 20:44

## 2018-04-07 RX ADMIN — AZITHROMYCIN MONOHYDRATE 250 MG: 250 TABLET ORAL at 08:35

## 2018-04-07 RX ADMIN — GABAPENTIN 300 MG: 300 CAPSULE ORAL at 08:35

## 2018-04-07 RX ADMIN — ACETAMINOPHEN 650 MG: 325 TABLET ORAL at 20:43

## 2018-04-07 RX ADMIN — POTASSIUM CHLORIDE 40 MEQ: 750 TABLET, FILM COATED, EXTENDED RELEASE ORAL at 12:56

## 2018-04-07 RX ADMIN — CLOZAPINE 100 MG: 100 TABLET ORAL at 22:48

## 2018-04-07 ASSESSMENT — ACTIVITIES OF DAILY LIVING (ADL)
TOILETING: 0 - INDEPENDENT
RETIRED_COMMUNICATION: 2-->DIFFICULTY UNDERSTANDING (NOT RELATED TO LANGUAGE BARRIER)
BATHING: 2 - ASSISTIVE PERSON
AMBULATION: 0 - INDEPENDENT
SWALLOWING: 0-->SWALLOWS FOODS/LIQUIDS WITHOUT DIFFICULTY
TOILETING: 0-->INDEPENDENT
RETIRED_EATING: 0-->INDEPENDENT
COGNITION: 1 - ATTENTION OR MEMORY DEFICITS
DRESS: 0 - INDEPENDENT
WHICH_OF_THE_ABOVE_FUNCTIONAL_RISKS_HAD_A_RECENT_ONSET_OR_CHANGE?: COGNITION
EATING: 0 - INDEPENDENT
AMBULATION: 0-->INDEPENDENT
TRANSFERRING: 0 - INDEPENDENT
FALL_HISTORY_WITHIN_LAST_SIX_MONTHS: NO
BATHING: 0-->INDEPENDENT
TRANSFERRING: 0-->INDEPENDENT
DRESS: 0-->INDEPENDENT

## 2018-04-07 NOTE — H&P
Attestation to admission H&P 18:  63 yo female transferred to the medical service for febrile illness concerning for pneumonia.  4/5 onset of nausea and diarrhea.  Associated shaking chills. HA, dizziness.  Procal 1.0.  CRP 47.6.  Alb 3.0.  CMP o/w unremarkable.  WBC 4.0  Hgb 12.5  UA moderate bacteria.  O wbc's.  BC's neg.  CXR  Ill-defined nodular opacities most prominent in the right  base. Differential would include infection.  .Emesis X1 . Recheck CRP 62.2.  Procal 0.34.  WBC 4.0.   Recurrent fever 102.5.  Lactic acid 2.5.  Seen by HO with transfer to medicine for suspected right sided pneumonia.  Placed on ceftriaxone and zuthromax.  Clinically indicates nausea and diarrhea resolved.  No CP, SOB, cough.  Limited appetite.  No reflux, abd pain.  No voiding c/o's,  No flu shot.  No recent ABs or h/o C. Diff.  Nasal swab neg influenza and RSV.  PMH (without serious illness), PSH, allergies, meds reviewed as documented.  FH:  Father  prostate CA.  M - CHF.  SH:  Single.  No children.  Habits - NS.  No alcohol.  ROS:  No arthralgias, rash, focal neuro c/o.    /86 (BP Location: Right arm)  Pulse 114  Temp 100.5  F (38.1  C) (Oral)  Resp 22  SpO2 98%  HEENT - EOMs and pupils normal.  Pharynx - clear.  Neck - no nodes, thyromegaly.  No carotid bruits.  Chest - clear without rales, rhonchi, bronchospasm  Cor - reg without gallop, murmur.  No JVD,  Abd - non distended, soft, non tender.  BS normal.   /rectal - deferred.  Extr - perfused.  No edema, calf, thigh tenderness to suggest DVT.   MSK - no synovitis.  Neuro - non focal.  Labs as above.  Recheck UA  many bacteria.  UC neg to date.  Lithium 0.28.  WBC 4.6.  hgb 11.6.  Platelets 158,000.  Lactic acid 0.5.  BMP normal except K 3.2  CXR reviewed with radiologist    Assess:    1)  Febrile illness exact etiology unclear.  Pneumonia 2nd to aspiration a possibility especially with recent N/V, however nodular infiltrate on CXR not impressive  and no associated CP, SOB, cough.  Potential gastroenteritis however N/V, diarrhea appear self limited.  No voiding symptoms to support UTI and despite bacteria on UA neg nitrite, no pyuria.  Consider drug reaction with recent addition of clozaril.  No clinical suggestion of NMS.  2)  N/V/D potentially related to viral gastroenteritis.  No h/o recent ABs to suggest C. Diff.  Appears self limited without need presently to look for enteric pathogens.  3)  Nodular infiltrate on CXR etiology, significance unclear.  CT would be helpful.   4)  MDD with psychotic features.     Plan:  Orders reviewed.  Continue IV ABs for now. Gentle IV fluids.   CT chest to further define infiltrates on CXR.  Trend labs.  Add CPK.  Replace K.  Add Mg.  F/u culture data. Swallow eval checking for aspiration ordered.  Psychiatry follow up.  Monitor clinically.

## 2018-04-07 NOTE — PROGRESS NOTES
"Pt has been isolative to her room all evening. She is not feeling well physically. Pt is social on approach. She states her mood, \"I am feeling depressed because I can't go home today.\" States she is experiencing some anxiety about feeling ill, \"I just wish this was over,\" referring to her pneumonia. Reports sleep was poor last night, \"I did sleep but it wasn't enough.\" Pt denies any SI/SIB thoughts. She denies hallucinations. She states her appetite has been poor, \"I ate a big breakfast and just had a little bit of lunch and dinner. I just don't have much of an appetite.\" No other concerns at this time. Will continue to monitor and assess.   "

## 2018-04-07 NOTE — PLAN OF CARE
"Problem: Patient Care Overview  Goal: Plan of Care/Patient Progress Review  Outcome: No Change    VS: Febrile this afternoon   O2: RA   Output: adequate   Last BM: 4/7/18   Activity: Up to the BR slowly independently   Skin: intact   Pain:    CMS:    Dressing:    Diet: regular   LDA: No IV access   Equipment: IV pole   Plan: TBD   Additional Info:  Had 1 episode of loose stool this AM. Pt reports \"feeling a little bit better today than yesterday\". Pt denies SI. Potassium replaced. Seen by Dr. Lin. Rajatpt brother visited. Tylenol 650 mg po given for T 100.5. Pt able to make needs known. Will cont.to monitor.           "

## 2018-04-07 NOTE — PLAN OF CARE
Problem: Patient Care Overview  Goal: Individualization & Mutuality  Pt transferred to the unit from  at 2330.  Pt was sleepy up on arrival to the unit and been sleeping since. Bed alarm activated. VSS. Afebrile. 02 sats in the upper 90s on RA,  Lungs clear. Denies SOB, CP and nausea. . Bowel sound active in all quadrants. No BM during the shift and passing gas. Pt unable to recall last BM.  Up to the bathroom with stand by assist. Denies pain. CMS intact, denies N/T. PIV patent and infusing . Pt slept between care and is able to make needs known, call light with in reach. Will continue to monitor.

## 2018-04-07 NOTE — H&P
"Admission History & Physical  April 6, 2018    Patient:   Elma Huntley, 62 year old female (MRN: [id number])  Admission Date:  3/24/2018  Admitting Service:  Hospitalist      CC: pneumonia    HPI  Elma Huntley is a 62 year old female currently admitted to [facility] for treatment of depression. She is now hospital day #12. Yesterday patient complained of \"feeling lousy\". RN noted lethargy and vitals concerning for Temp 102.3F. CXR showed right lower lobe infiltrate concerning for CAP/HCAP vs. Silent aspiration pneumonia. No known sick contacts. She had one episode of diarrhea yesterday morning. She also endorses chills, fatigue, and nausea. No vomiting. She denies shortness of breath, chest pain. No LE swelling. Patient has been persistently tachycardic throughout the day. She also continues to spike fevers >101F despite use of tylenol. Lactate is elevated at 2.5. Normal WBC. Blood cultures negative to date. UA unremarkable. She has no know history of cardiopulmonary disease. She has received 1L IVF and remains febrile. Will transfer to medicine for continued management of pneumonia with early sepsis.    ROS  10-point review of systems negative apart from noted in HPI.    Past Medical and Surgical History  No past medical history on file.  No past surgical history on file.    Medications Prior to Admission    No current facility-administered medications on file prior to encounter.   No current outpatient prescriptions on file prior to encounter.    Family History  No family history on file.    Social History  Social History     Social History     Marital status: Single     Spouse name: N/A     Number of children: N/A     Years of education: N/A     Occupational History     Not on file.     Social History Main Topics     Smoking status: Not on file     Smokeless tobacco: Not on file     Alcohol use Not on file     Drug use: Not on file     Sexual activity: Not on file     Other Topics Concern     Not " on file     Social History Narrative       Allergies  No Known Allergies    Physical Exam  /54  Pulse 106  Temp 101.7  F (38.7  C) (Oral)  Resp 16  Wt 64.1 kg (141 lb 4.8 oz)  SpO2 95%  BMI 22.13 kg/m2  Wt Readings from Last 4 Encounters:   04/03/18 64.1 kg (141 lb 4.8 oz)   05/01/14 50.8 kg (112 lb)       General: Ill-appearing, pale, alert, interactive lying in hospital bed   HEENT: SOUTH, dry mucous membranes, anicteric   Neck: Supple, No JVD   Chest:  CTAB, no wheezing, no crackles, normal respiratory effort on room air.   CV: RRR, good pulses, S1 S2 normal, no M/R/G   Abdomen: Soft, Non-tender, non-distended, normal bowel sounds   Ext: Warm, no peripheral edema   Skin: No rashes or ulcers visible   Neuro: A&Ox3, no focal deficits apparent     Results    CXR 4/5/2018:  Impression: Ill-defined nodular opacities most prominent in the right base.   Differential would include infection. Consider chest CT for further evaluation.       LABS  CBC  Recent Labs  Lab 04/06/18  0748 04/05/18  1416 04/05/18  0822   WBC 4.0 4.0 5.4   HGB 11.8 12.5  --     206  --      BMP  Recent Labs  Lab 04/05/18  1416      POTASSIUM 3.7   CHLORIDE 103   CO2 24   BUN 12   CR 0.50*   *     LFT  Recent Labs  Lab 04/05/18  1416   AST 12   ALT 8   ALKPHOS 88   BILITOTAL 0.2   ALBUMIN 3.0*       No results found for: A1C      Assessment and Plan    Elma Huntley is a 62 year old female being admitted from behavioral health for management of RLL infiltrate concerning for HCAP and early sepsis.    #1) Pneumonia, right lung  - influenza, RSV negative  - s/p 1L bolus, will continue IVF at 75 cc/hr  - continue ceftriaxone and azithromycin, consider broadening coverage if patient remains febrile   - tylenol prn  - repeat lactate in am  - pulse ox with vitals    #2) Depression  - continue clozapine, gabapentin, lithium, trazodone as previously scheduled    DIET: regular, adult  PPx: none, patient ambulatory  Code Status:  full

## 2018-04-07 NOTE — PROGRESS NOTES
Brief Internal Medicine Note, 4/6/18:    IM notified this afternoon that pt was seen by moonlighter last evening 4/5 due to worsening fevers and found to have pneumonia.        # HCAP vs CAP vs aspiration PNA in setting of worsening fevers - CXR evening 4/5/18 with Ill-defined nodular opacities most prominent in the right base.  Was started on IV Azithromycin and Ceftriaxone by moonlighter.  Tachycardia to 120s today, continues to be febrile. No apparent cough, congestion, or hypoxia.  No leukocytosis.  CRP slightly increasing 47.6 -> 62.2.  Unknown if recent sick contacts on unit.  LA ordered stat, results pending.   - For now, will treat as HCAP vs CAP, thought right sided opacities potentially c/f silent aspiration vs acute aspiration event.  Will order SLP evaluation.    - Will continue Ceftriaxone IV for now and de-escalate as appropriate pending SLP evaluation, fever resolution, and stable hemodynamics.  Started PO Azithromycin.    - 1L fluid bolus x 1 over 2 hours.  Can start MIVFs when able to get sitter.   - Please encourage pt to wear mask in common areas and OT/group room if significant cough and continued fevers.  Please wear mask when entering and providing care in patient room.    - Will reassess in AM.    - If worsening fever, respiratory symptoms, hypoxia, new O2 requirements, elevated LA, and/or concerning assessment of lung sounds, please page moonlight and anticipate transfer to medicine.      [facility]  Hospitalist Service   Pager: [phone]

## 2018-04-07 NOTE — PLAN OF CARE
"Problem: Depressive Symptoms  Goal: Depressive Symptoms  Signs and symptoms of listed problems will be absent or manageable.   Absense of SI.  Participate in structures activities.  Eat 50 to 75% of meals.  Sleep 6 to 8 hours at night.   Outcome: Improving  Pt has been isolative to her room all evening. She is not feeling well physically. Pt is social on approach. She states her mood, \"I am feeling depressed because I can't go home today.\" States she is experiencing some anxiety about feeling ill, \"I just wish this was over,\" referring to her pneumonia. Reports sleep was poor last night, \"I did sleep but it wasn't enough.\" Pt denies any SI/SIB thoughts. She denies hallucinations. She states her appetite has been poor, \"I ate a big breakfast and just had a little bit of lunch and dinner. I just don't have much of an appetite.\" No other concerns at this time. Will continue to monitor and assess.       "

## 2018-04-08 ENCOUNTER — APPOINTMENT (OUTPATIENT)
Dept: SPEECH THERAPY | Facility: CLINIC | Age: 63
End: 2018-04-08
Attending: INTERNAL MEDICINE
Payer: COMMERCIAL

## 2018-04-08 LAB
ANION GAP SERPL CALCULATED.3IONS-SCNC: 6 MMOL/L (ref 3–14)
BASOPHILS # BLD AUTO: 0 10E9/L (ref 0–0.2)
BASOPHILS NFR BLD AUTO: 0.1 %
BUN SERPL-MCNC: 6 MG/DL (ref 7–30)
C COLI+JEJUNI+LARI FUSA STL QL NAA+PROBE: NOT DETECTED
C DIFF TOX B STL QL: NEGATIVE
CALCIUM SERPL-MCNC: 8.4 MG/DL (ref 8.5–10.1)
CHLORIDE SERPL-SCNC: 110 MMOL/L (ref 94–109)
CO2 SERPL-SCNC: 25 MMOL/L (ref 20–32)
CREAT SERPL-MCNC: 0.43 MG/DL (ref 0.52–1.04)
CRP SERPL-MCNC: 73.8 MG/L (ref 0–8)
DIFFERENTIAL METHOD BLD: ABNORMAL
EC STX1 GENE STL QL NAA+PROBE: NOT DETECTED
EC STX2 GENE STL QL NAA+PROBE: NOT DETECTED
ENTERIC PATHOGEN COMMENT: NORMAL
EOSINOPHIL # BLD AUTO: 0.3 10E9/L (ref 0–0.7)
EOSINOPHIL NFR BLD AUTO: 4 %
ERYTHROCYTE [DISTWIDTH] IN BLOOD BY AUTOMATED COUNT: 14.4 % (ref 10–15)
GFR SERPL CREATININE-BSD FRML MDRD: >90 ML/MIN/1.7M2
GLUCOSE SERPL-MCNC: 108 MG/DL (ref 70–99)
HCT VFR BLD AUTO: 33.7 % (ref 35–47)
HGB BLD-MCNC: 10.9 G/DL (ref 11.7–15.7)
IMM GRANULOCYTES # BLD: 0.1 10E9/L (ref 0–0.4)
IMM GRANULOCYTES NFR BLD: 1 %
LACTATE BLD-SCNC: 1.6 MMOL/L (ref 0.4–1.9)
LITHIUM SERPL-SCNC: 0.43 MMOL/L (ref 0.6–1.2)
LYMPHOCYTES # BLD AUTO: 0.8 10E9/L (ref 0.8–5.3)
LYMPHOCYTES NFR BLD AUTO: 11.6 %
MCH RBC QN AUTO: 31.2 PG (ref 26.5–33)
MCHC RBC AUTO-ENTMCNC: 32.3 G/DL (ref 31.5–36.5)
MCV RBC AUTO: 97 FL (ref 78–100)
MONOCYTES # BLD AUTO: 0.6 10E9/L (ref 0–1.3)
MONOCYTES NFR BLD AUTO: 7.9 %
NEUTROPHILS # BLD AUTO: 5.3 10E9/L (ref 1.6–8.3)
NEUTROPHILS NFR BLD AUTO: 75.4 %
NOROV GI+II ORF1-ORF2 JNC STL QL NAA+PR: NOT DETECTED
NRBC # BLD AUTO: 0 10*3/UL
NRBC BLD AUTO-RTO: 0 /100
PLATELET # BLD AUTO: 186 10E9/L (ref 150–450)
POTASSIUM SERPL-SCNC: 3.4 MMOL/L (ref 3.4–5.3)
RBC # BLD AUTO: 3.49 10E12/L (ref 3.8–5.2)
RVA NSP5 STL QL NAA+PROBE: NOT DETECTED
SALMONELLA SP RPOD STL QL NAA+PROBE: NOT DETECTED
SHIGELLA SP+EIEC IPAH STL QL NAA+PROBE: NOT DETECTED
SODIUM SERPL-SCNC: 141 MMOL/L (ref 133–144)
SPECIMEN SOURCE: NORMAL
V CHOL+PARA RFBL+TRKH+TNAA STL QL NAA+PR: NOT DETECTED
WBC # BLD AUTO: 7.1 10E9/L (ref 4–11)
Y ENTERO RECN STL QL NAA+PROBE: NOT DETECTED

## 2018-04-08 PROCEDURE — 83605 ASSAY OF LACTIC ACID: CPT | Performed by: INTERNAL MEDICINE

## 2018-04-08 PROCEDURE — 92610 EVALUATE SWALLOWING FUNCTION: CPT | Mod: GN | Performed by: SPEECH-LANGUAGE PATHOLOGIST

## 2018-04-08 PROCEDURE — 40000225 ZZH STATISTIC SLP WARD VISIT: Performed by: SPEECH-LANGUAGE PATHOLOGIST

## 2018-04-08 PROCEDURE — 85025 COMPLETE CBC W/AUTO DIFF WBC: CPT | Performed by: INTERNAL MEDICINE

## 2018-04-08 PROCEDURE — 80048 BASIC METABOLIC PNL TOTAL CA: CPT | Performed by: INTERNAL MEDICINE

## 2018-04-08 PROCEDURE — 86140 C-REACTIVE PROTEIN: CPT | Performed by: INTERNAL MEDICINE

## 2018-04-08 PROCEDURE — 25000128 H RX IP 250 OP 636: Performed by: INTERNAL MEDICINE

## 2018-04-08 PROCEDURE — 99221 1ST HOSP IP/OBS SF/LOW 40: CPT

## 2018-04-08 PROCEDURE — 36415 COLL VENOUS BLD VENIPUNCTURE: CPT | Performed by: INTERNAL MEDICINE

## 2018-04-08 PROCEDURE — 80178 ASSAY OF LITHIUM: CPT | Performed by: INTERNAL MEDICINE

## 2018-04-08 PROCEDURE — 12000001 ZZH R&B MED SURG/OB UMMC

## 2018-04-08 PROCEDURE — 87506 IADNA-DNA/RNA PROBE TQ 6-11: CPT | Performed by: INTERNAL MEDICINE

## 2018-04-08 PROCEDURE — 87493 C DIFF AMPLIFIED PROBE: CPT | Performed by: INTERNAL MEDICINE

## 2018-04-08 PROCEDURE — 25000132 ZZH RX MED GY IP 250 OP 250 PS 637: Performed by: INTERNAL MEDICINE

## 2018-04-08 PROCEDURE — 99232 SBSQ HOSP IP/OBS MODERATE 35: CPT | Performed by: INTERNAL MEDICINE

## 2018-04-08 RX ORDER — SACCHAROMYCES BOULARDII 250 MG
250 CAPSULE ORAL 2 TIMES DAILY
Status: DISCONTINUED | OUTPATIENT
Start: 2018-04-08 | End: 2018-04-11 | Stop reason: HOSPADM

## 2018-04-08 RX ORDER — POTASSIUM CHLORIDE 750 MG/1
40 TABLET, EXTENDED RELEASE ORAL ONCE
Status: COMPLETED | OUTPATIENT
Start: 2018-04-08 | End: 2018-04-08

## 2018-04-08 RX ADMIN — CLOZAPINE 100 MG: 100 TABLET ORAL at 21:06

## 2018-04-08 RX ADMIN — CEFTRIAXONE SODIUM 1 G: 1 INJECTION, POWDER, FOR SOLUTION INTRAMUSCULAR; INTRAVENOUS at 02:08

## 2018-04-08 RX ADMIN — ACETAMINOPHEN 650 MG: 325 TABLET ORAL at 15:50

## 2018-04-08 RX ADMIN — AZITHROMYCIN MONOHYDRATE 250 MG: 250 TABLET ORAL at 08:42

## 2018-04-08 RX ADMIN — Medication 250 MG: at 21:06

## 2018-04-08 RX ADMIN — SODIUM CHLORIDE 1000 ML: 9 INJECTION, SOLUTION INTRAVENOUS at 14:10

## 2018-04-08 RX ADMIN — Medication 250 MG: at 13:00

## 2018-04-08 RX ADMIN — GABAPENTIN 300 MG: 300 CAPSULE ORAL at 21:06

## 2018-04-08 RX ADMIN — ACETAMINOPHEN 650 MG: 325 TABLET ORAL at 23:55

## 2018-04-08 RX ADMIN — POTASSIUM CHLORIDE 40 MEQ: 750 TABLET, FILM COATED, EXTENDED RELEASE ORAL at 08:42

## 2018-04-08 RX ADMIN — GABAPENTIN 300 MG: 300 CAPSULE ORAL at 08:42

## 2018-04-08 RX ADMIN — TRAZODONE HYDROCHLORIDE 100 MG: 100 TABLET ORAL at 21:06

## 2018-04-08 RX ADMIN — LITHIUM CARBONATE 450 MG: 450 TABLET, EXTENDED RELEASE ORAL at 08:42

## 2018-04-08 NOTE — PLAN OF CARE
Problem: Patient Care Overview  Goal: Plan of Care/Patient Progress Review  Outcome: Change based on patient need/priority Date Met: 04/08/18    VS: /60, fluids infusing, recheck /67, other VSS.   O2: RA.   Output: Voiding in bathroom independently.   Last BM: Loose stools all night, enteric precautions initiated, stool sample collected and sent to lab, extra scrub pants and pull ups in room.   Activity: Independent.   Skin: Intact.   Pain: Denies.   CMS: Intact.   Dressing: NA.   Diet: Combo/reg.   LDA: PIV left lower forearm infusing NS at 100 ml/hr.   Equipment: IV pole, pillows, call light within reach.   Plan: Continue to monitor. Swallow study today.   Additional Info: Pt denies SI.

## 2018-04-08 NOTE — PROGRESS NOTES
"[facility] Internal Medicine Progress Note            Interval History:   Record reviewed.  Seen with RN.  CT chest 4/7 reviewed.  No pneumonia.  Granulomas right lung accounting for nodular densities noted on CXR.  Tmax 100.5.  Recurrent \"diarrhea\" over night and this AM (more pudding consistency).  Stool C. Diff neg.  No nausea, reflux, abd pain.  No hematochezia.  No dysuria.    No CP, SOB, cough.  Mild unsteadiness when up to BR.  Not syncopal.           Medications:   All medications reviewed today          Physical Exam:   Blood pressure 138/74, pulse 96, temperature 97  F (36.1  C), resp. rate 18, SpO2 98 %.  No intake or output data in the 24 hours ending 04/08/18 1409    General:  Alert.  Appropriate.  No distress.  No  O2.     Heent:      Neck:    Skin:    Chest:  clear    Cardiac:  Reg without gallop, murmur.  No JVD.     Abdomen:  Non distended, soft, non-tender.  BS normal.     Extremities:  Perfused.  No edema, calf, posterior thigh tenderness to suggest DVT.     Neuro:            Data:     Results for orders placed or performed during the hospital encounter of 04/06/18 (from the past 24 hour(s))   CT Chest w/o Contrast    Narrative    Exam: CT Chest without contrast 4/7/2018 4:47 PM     History: f/u infiltrate right lung on CXR 4/5.;     Comparison: Chest radiograph 4/5/2018.    TECHNIQUE: Helical acquisition of CT images from the lung apices to  the kidneys without IV contrast. Multiplanar reconstructions obtained  and reviewed.    FINDINGS:   Chest:  Heart size is normal, without pericardial effusion. Main pulmonary  artery is borderline enlarged measuring up to 2.9 cm. Aorta is normal  in caliber. Normal aortic branching pattern. Visualized thyroid is  unremarkable. No mediastinal or hilar lymphadenopathy on this  noncontrast exam. Partial visualization of an enlarged right axillary  lymph nodes, including 13 mm node (series 2, image 17), and rounded 6  mm node (series 2, image 21).    The " central tracheobronchial tree is patent. No pleural effusion or  pneumothorax. No consolidative pulmonary opacities. Right greater than  left biapical pleural-parenchymal scarring. Multiple calcified  granulomas in the right lower lobe, likely correlating with nodular  opacities in prior radiograph. Triangular opacity along the right  major fissure (series 6, image 52) likely represents an intrafissural  lymph node. No suspicious pulmonary nodules.    Upper abdomen, bones, and soft tissues:  Visualized upper abdomen is unremarkable. No suspicious osseous  abnormality. Multilevel degenerative change in the thoracic spine.      Impression    IMPRESSION:  1. Multiple calcified granulomas in the right lung, correlating with  nodular opacities on prior radiograph.  2. Biapical pleural-parenchymal scarring, greatest on the right.  3. Nonspecific right axillary lymphadenopathy may be reactive.    I have personally reviewed the examination and initial interpretation  and I agree with the findings.    JOVON LONDONO MD   Lactic acid level STAT for sepsis protocol   Result Value Ref Range    Lactate for Sepsis Protocol 0.7 0.4 - 1.9 mmol/L   Clostridium difficile toxin B PCR   Result Value Ref Range    Specimen Description Feces     C Diff Toxin B PCR Negative NEG^Negative   Enteric Bacteria and Virus Panel by RENATA Stool   Result Value Ref Range    Campylobacter group by RENATA Not Detected NDET^Not Detected    Salmonella species by RENATA Not Detected NDET^Not Detected    Shigella species by RENATA Not Detected NDET^Not Detected    Vibrio group by RENATA Not Detected NDET^Not Detected    Rotavirus A by RENATA Not Detected NDET^Not Detected    Shiga toxin 1 gene by RENATA Not Detected NDET^Not Detected    Shiga toxin 2 gene by RENATA Not Detected NDET^Not Detected    Norovirus I and II by RENATA Not Detected NDET^Not Detected    Yersinia enterocolitica by RENATA Not Detected NDET^Not Detected    Enteric pathogen comment       Testing performed by  multiplexed, qualitative PCR using the [employer] Enteric   Pathogens Nucleic Acid Test. Results should not be used as the sole basis for diagnosis,   treatment, or other patient management decisions.     Basic metabolic panel   Result Value Ref Range    Sodium 141 133 - 144 mmol/L    Potassium 3.4 3.4 - 5.3 mmol/L    Chloride 110 (H) 94 - 109 mmol/L    Carbon Dioxide 25 20 - 32 mmol/L    Anion Gap 6 3 - 14 mmol/L    Glucose 108 (H) 70 - 99 mg/dL    Urea Nitrogen 6 (L) 7 - 30 mg/dL    Creatinine 0.43 (L) 0.52 - 1.04 mg/dL    GFR Estimate >90 >60 mL/min/1.7m2    GFR Estimate If Black >90 >60 mL/min/1.7m2    Calcium 8.4 (L) 8.5 - 10.1 mg/dL   CRP inflammation   Result Value Ref Range    CRP Inflammation 73.8 (H) 0.0 - 8.0 mg/L   CBC with platelets differential   Result Value Ref Range    WBC 7.1 4.0 - 11.0 10e9/L    RBC Count 3.49 (L) 3.8 - 5.2 10e12/L    Hemoglobin 10.9 (L) 11.7 - 15.7 g/dL    Hematocrit 33.7 (L) 35.0 - 47.0 %    MCV 97 78 - 100 fl    MCH 31.2 26.5 - 33.0 pg    MCHC 32.3 31.5 - 36.5 g/dL    RDW 14.4 10.0 - 15.0 %    Platelet Count 186 150 - 450 10e9/L    Diff Method Automated Method     % Neutrophils 75.4 %    % Lymphocytes 11.6 %    % Monocytes 7.9 %    % Eosinophils 4.0 %    % Basophils 0.1 %    % Immature Granulocytes 1.0 %    Nucleated RBCs 0 0 /100    Absolute Neutrophil 5.3 1.6 - 8.3 10e9/L    Absolute Lymphocytes 0.8 0.8 - 5.3 10e9/L    Absolute Monocytes 0.6 0.0 - 1.3 10e9/L    Absolute Eosinophils 0.3 0.0 - 0.7 10e9/L    Absolute Basophils 0.0 0.0 - 0.2 10e9/L    Abs Immature Granulocytes 0.1 0 - 0.4 10e9/L    Absolute Nucleated RBC 0.0    Lithium level   Result Value Ref Range    Lithium Level 0.43 (L) 0.60 - 1.20 mmol/L     Stool enteric pathogen neg.            Assessment and Plan:   1)  Febrile illness exact etiology unclear.  Pneumonia 2nd to aspiration appears less likely with lack of pulmonary symptoms or confirmed lung infiltrate on chest CT.  Potential relation to diarrheal  illness.   Consider drug reaction with recent addition of clozaril.  No clinical suggestion of NMS.  2)  N/V/D potentially related to viral gastroenteritis.  Neg C. Diff. More recent frequent BM likely related to ABs.   3)  Nodular infiltrate on CXR, granulomas on chest CT.   4)  MDD with psychotic features.    PLAN:   1)  As no clear evidence for pneumonia DC zithromax and cetriaxone.  2)  IV fluid support.  3)  Probiotic.  4)  Up with assist. Ambulate with staff as tolerated.  5)  Psychiatry follow up regarding dispo.  6)  F/u AM labs.  7)  Monitor clinically.    Disposition Plan   Expected discharge in 1-2 days to home vs psychiatry once medically stable and psyche input in place.      Entered: Jw Lin 04/08/2018, 2:09 PM              Attestation:  I have reviewed today's vital signs, notes, medications, labs and imaging.     Jw Lin MD

## 2018-04-08 NOTE — PROGRESS NOTES
04/08/18 0800   General Information   Onset Date 04/06/18   Start of Care Date 04/08/18   Referring Physician Hazel Perkins MD   Patient/Family Goals Statement to keep eating a regular diet   Comments 63 yo female transferred to the medical service for febrile illness concerning for pneumonia.  4/5 onset of nausea and diarrhea. Pt referred for swallow eval to r.o aspiration pna 2/2 pt vomiting. Pt does not report any difficulty with swallowing and RN does not note any difficulty with swallowing   Clinical Swallow Evaluation   Oral Musculature generally intact   Structural Abnormalities none present   Dentition present and adequate   Mucosal Quality good   Mandibular Strength and Mobility intact   Oral Labial Strength and Mobility WFL   Lingual Strength and Mobility WFL   Velar Elevation intact   Buccal Strength and Mobility intact   Laryngeal Function Cough;Throat clear;Swallow;Voicing initiated;Dry swallow palpated   Additional Documentation Yes   Additional evaluation(s) completed today No   Swallow Eval   Feeding Assistance no assistance needed   Clinical Swallow Eval: Thin Liquid Texture Trial   Mode of Presentation, Thin Liquids cup;self-fed   Volume of Liquid or Food Presented 4 oz    Oral Phase of Swallow WFL   Pharyngeal Phase of Swallow intact   Diagnostic Statement No s/s of aspiration with single sips of thin liquids and with consecutive multiple swallows of thin liquids. Swallow response timely   Clinical Swallow Eval: Solid Food Texture Trial   Mode of Presentation, Solid self-fed   Volume of Solid Food Presented multiple bites of regular solids at breakfast meal   Oral Phase, Solid WFL   Pharyngeal Phase, Solid intact   Diagnostic Statement No s/s of aspiration with multiple bites of regular solids; oral prep WFL; no oral residue and no sensation of pharyngeal retention; swallow response timely   VFSS Evaluation   VFSS Additional Documentation No   FEES Evaluation   Additional Documentation No    Swallow Compensations   Swallow Compensations No compensations were used   Esophageal Phase of Swallow   Patient reports or presents with symptoms of esophageal dysphagia No   Swallow Eval: Clinical Impressions   Skilled Criteria for Therapy Intervention No problems identified which require skilled intervention   Functional Assessment Scale (FAS) 7   Dysphagia Outcome Severity Scale (KAREN) Level 7 - KAREN   Treatment Diagnosis Normal swallow function- no dysphagia   Diet texture recommendations Regular diet;Thin liquids   Anticipated Discharge Disposition home   Patient, family and/or staff in agreement with Plan of Care Yes   Clinical Impression Comments Completed clinical bedside swallow eval per MD orders. Pt presents with normal swallow function. Pt observed at meal on regular solid textures and thin liquids- no overt s/s of aspiration with multiple consecutive swallows of thin liquids or with regular solids, swallow response is timely, no oral residue and no sensation of pharyngeal retention. As swallow function is WNL then no further sptx intervention is indicated at this time- will sign off.    Total Evaluation Time   Total Evaluation Time (Minutes) 10

## 2018-04-08 NOTE — PLAN OF CARE
Pt Alert and Oriented X 4. Tachycardic,Temp max 101.4, tylenol given, effective. Lactic acid for sepsis 1.6 mmol/L. IS encouraged. Denies SI and hallucination. Lungs- Clear bilaterally with both anterior and posterior. Bowels- Hyperactive in all four quadrants. PP+ DP+. CMS and Neuro's are intact to baseline. Denies numbness and tingling in all extremities. Denies nausea, shortness of breath, and chest pain. Voids spontaneously without difficulty in BR, up with Aof1, ambulated in hallway. Is on a regular diet and appetite was good this shift. PIV is patent in the left hand and continues IVF 75 cc/hr. Pt is able to make needs known and the call light is within the pt's reach. Continue to monitor.

## 2018-04-08 NOTE — PLAN OF CARE
Problem: Patient Care Overview  Goal: Plan of Care/Patient Progress Review  Completed clinical bedside swallow eval per MD orders. Pt presents with normal swallow function. Pt observed at meal on regular solid textures and thin liquids- no overt s/s of aspiration with multiple consecutive swallows of thin liquids or with regular solids, swallow response is timely, no oral residue and no sensation of pharyngeal retention. Recommend continue with current regular diet with thin liquids.  As swallow function is WNL then no further sptx intervention is indicated at this time- will sign off.

## 2018-04-08 NOTE — PLAN OF CARE
Problem: Patient Care Overview  Goal: Plan of Care/Patient Progress Review  Outcome: Improving    VS: stable   O2: RA   Output: adequate   Last BM: 4/8/18   Activity: Up to the BR with assist of 1   Skin: intact   Pain: Denies pain   CMS: NA   Dressing: NA   Diet: regular   LDA: IV infusing   Equipment: IV pole.   Plan: TBD   Additional Info:  Had 2 episodes of loose stools. CDIFF is negative. IV infusing at 75 cc /hr. Pt report a slightly unstable with ambulation. Poli. continue POC.         Ambulated in the hallway for approximately 100 ft. Pt reports feeling shaky but not lightheaded. Will cont. To monitor.

## 2018-04-08 NOTE — PLAN OF CARE
Problem: Patient Care Overview  Goal: Plan of Care/Patient Progress Review          VS:       Pt A/O X 4. Afebrile. Pt triggered sepsis protocol this shift, lactic acid result was 0.7, VSS. Moonlighter updated by text page. Lungs- clear bilaterally with both anterior and posterior. CT of chest completed this shift. IS encouraged. Denies nausea, shortness of breath, and chest pain.     Output:       Bowels- active in all four quadrants. Pt did not have BM this shift. Voids spontaneously without difficulty in the bathroom.      Activity:       Pt up in room and to bathroom with assist of standby, gait steady.     Skin:   Intact.     Pain:       Has generalized pain and given prn Tylenol and is tolerating well.      CMS:       CMS and Neuro's are intact. Denies numbness and tingling in all extremities.      Dressing:       No dressing in place.      Diet:       Pt is on a combination regular diet and appetite was good this shift.       LDA:       PIV is patent in the left arm and SL.      Equipment:       Bilateral heels are elevated off the bed.     Plan:       Pt is able to make needs known and the call light is within the pt's reach. Continue to monitor.       Additional Info:

## 2018-04-09 LAB
ANION GAP SERPL CALCULATED.3IONS-SCNC: 8 MMOL/L (ref 3–14)
BUN SERPL-MCNC: 4 MG/DL (ref 7–30)
CALCIUM SERPL-MCNC: 8 MG/DL (ref 8.5–10.1)
CHLORIDE SERPL-SCNC: 108 MMOL/L (ref 94–109)
CLOZAPINE AND METABOLITES TOTAL: 861 NG/ML
CLOZAPINE SERPL-MCNC: 626 NG/ML
CLOZAPINE-N-OXIDE QUANT: <100 NG/ML
CO2 SERPL-SCNC: 24 MMOL/L (ref 20–32)
CREAT SERPL-MCNC: 0.43 MG/DL (ref 0.52–1.04)
CRP SERPL-MCNC: 69.6 MG/L (ref 0–8)
ERYTHROCYTE [DISTWIDTH] IN BLOOD BY AUTOMATED COUNT: 14.5 % (ref 10–15)
GFR SERPL CREATININE-BSD FRML MDRD: >90 ML/MIN/1.7M2
GLUCOSE SERPL-MCNC: 91 MG/DL (ref 70–99)
HCT VFR BLD AUTO: 32 % (ref 35–47)
HGB BLD-MCNC: 10.4 G/DL (ref 11.7–15.7)
MCH RBC QN AUTO: 31.3 PG (ref 26.5–33)
MCHC RBC AUTO-ENTMCNC: 32.5 G/DL (ref 31.5–36.5)
MCV RBC AUTO: 96 FL (ref 78–100)
NORCLOZAPINE SERPL-MCNC: 235 NG/ML
PLATELET # BLD AUTO: 205 10E9/L (ref 150–450)
POTASSIUM SERPL-SCNC: 3.5 MMOL/L (ref 3.4–5.3)
RBC # BLD AUTO: 3.32 10E12/L (ref 3.8–5.2)
SODIUM SERPL-SCNC: 140 MMOL/L (ref 133–144)
WBC # BLD AUTO: 7.3 10E9/L (ref 4–11)

## 2018-04-09 PROCEDURE — 25000132 ZZH RX MED GY IP 250 OP 250 PS 637: Performed by: INTERNAL MEDICINE

## 2018-04-09 PROCEDURE — 86140 C-REACTIVE PROTEIN: CPT | Performed by: INTERNAL MEDICINE

## 2018-04-09 PROCEDURE — 80048 BASIC METABOLIC PNL TOTAL CA: CPT | Performed by: INTERNAL MEDICINE

## 2018-04-09 PROCEDURE — 36415 COLL VENOUS BLD VENIPUNCTURE: CPT | Performed by: INTERNAL MEDICINE

## 2018-04-09 PROCEDURE — 12000001 ZZH R&B MED SURG/OB UMMC

## 2018-04-09 PROCEDURE — 25000128 H RX IP 250 OP 636: Performed by: INTERNAL MEDICINE

## 2018-04-09 PROCEDURE — 99232 SBSQ HOSP IP/OBS MODERATE 35: CPT | Performed by: INTERNAL MEDICINE

## 2018-04-09 PROCEDURE — 85027 COMPLETE CBC AUTOMATED: CPT | Performed by: INTERNAL MEDICINE

## 2018-04-09 RX ADMIN — GABAPENTIN 300 MG: 300 CAPSULE ORAL at 08:32

## 2018-04-09 RX ADMIN — LITHIUM CARBONATE 450 MG: 450 TABLET, EXTENDED RELEASE ORAL at 08:32

## 2018-04-09 RX ADMIN — ACETAMINOPHEN 650 MG: 325 TABLET ORAL at 16:39

## 2018-04-09 RX ADMIN — CLOZAPINE 100 MG: 100 TABLET ORAL at 21:09

## 2018-04-09 RX ADMIN — Medication 250 MG: at 21:09

## 2018-04-09 RX ADMIN — GABAPENTIN 300 MG: 300 CAPSULE ORAL at 21:09

## 2018-04-09 RX ADMIN — TRAZODONE HYDROCHLORIDE 100 MG: 100 TABLET ORAL at 21:09

## 2018-04-09 RX ADMIN — SODIUM CHLORIDE: 9 INJECTION, SOLUTION INTRAVENOUS at 16:39

## 2018-04-09 RX ADMIN — Medication 250 MG: at 08:32

## 2018-04-09 NOTE — PLAN OF CARE
Problem: Patient Care Overview  Goal: Plan of Care/Patient Progress Review    VS: BP stable. . T of 100, 100.7. Tylenol given, decreased to 98.8. Encouraged oral fluids & IS use.   O2: 96% on RA, denies SOB    Output: Voiding adequately in bathroom    Last BM: 4/8/2018, passing gas. C. Diff test negative. No loose stools this shift.   Activity: Independent in room but told pt to call if she gets up for SBA    Skin: Intact    Pain: Denied pain throughout shift. Tylenol given for temperature   CMS: Intact; denies numbness/tingling in all extremities    Dressing: None   Diet: Regular   LDA: PIV in L hand infusing @ 75 ml/hr    Equipment:    Plan: Continue to monitor. Possible discharge in 2-3 days    Additional Info: Denied SI ideation or hallucinations

## 2018-04-09 NOTE — PROGRESS NOTES
[facility] Internal Medicine Progress Note            Interval History:   Record reviewed.  Seen with RN.  Off ABs based on neg CT 4/7 for pneumonia.   Diarrhea resolved.  Stool neg C. Diff.   Recurrent fever during day 4/8 with 101.4 max.  Chilling.  No cultures.  Clinically feeling well this AM.  No HA, dizziness, CP, SOB, cough, nausea, reflux, abd pain.  Formed BM this AM.  No voiding c/o's.  No arthralgias or rash.  Ambulatory.             Medications:   All medications reviewed today          Physical Exam:   Blood pressure 102/55, pulse 100, temperature 96.9  F (36.1  C), resp. rate 16, SpO2 97 %.  No intake or output data in the 24 hours ending 04/08/18 1409    General:  Alert.  Appropriate.  No distress.  No  O2.     Heent:      Neck:    Skin:  No rash.  No IV site erythema or induration.     Chest:  clear    Cardiac:  Reg without gallop, murmur.  No JVD.     Abdomen:  Non distended, soft, non-tender.  BS normal.     Extremities:  Perfused.  No edema, calf, posterior thigh tenderness to suggest DVT.     Neuro:            Data:     Results for orders placed or performed during the hospital encounter of 04/06/18 (from the past 24 hour(s))   Lactic acid level STAT for sepsis protocol   Result Value Ref Range    Lactate for Sepsis Protocol 1.6 0.4 - 1.9 mmol/L   Basic metabolic panel   Result Value Ref Range    Sodium 140 133 - 144 mmol/L    Potassium 3.5 3.4 - 5.3 mmol/L    Chloride 108 94 - 109 mmol/L    Carbon Dioxide 24 20 - 32 mmol/L    Anion Gap 8 3 - 14 mmol/L    Glucose 91 70 - 99 mg/dL    Urea Nitrogen 4 (L) 7 - 30 mg/dL    Creatinine 0.43 (L) 0.52 - 1.04 mg/dL    GFR Estimate >90 >60 mL/min/1.7m2    GFR Estimate If Black >90 >60 mL/min/1.7m2    Calcium 8.0 (L) 8.5 - 10.1 mg/dL   CBC with platelets   Result Value Ref Range    WBC 7.3 4.0 - 11.0 10e9/L    RBC Count 3.32 (L) 3.8 - 5.2 10e12/L    Hemoglobin 10.4 (L) 11.7 - 15.7 g/dL    Hematocrit 32.0 (L) 35.0 - 47.0 %    MCV 96 78 - 100 fl    MCH  31.3 26.5 - 33.0 pg    MCHC 32.5 31.5 - 36.5 g/dL    RDW 14.5 10.0 - 15.0 %    Platelet Count 205 150 - 450 10e9/L   CRP inflammation   Result Value Ref Range    CRP Inflammation 69.6 (H) 0.0 - 8.0 mg/L     Stool enteric pathogen neg.   C. Diff neg.         Assessment and Plan:   1)  Febrile illness exact etiology unclear.  Pneumonia 2nd to aspiration appears less likely with lack of pulmonary symptoms or confirmed lung infiltrate on chest CT.  Potential relation to diarrheal illness. Neg stool studies.  No skin changes.  Potential viral illness.  Consider drug reaction with recent addition of clozaril.  No clinical suggestion of NMS.  2)  N/V/D potentially related to viral gastroenteritis.  Neg C. Diff. More recent frequent BM likely related to ABs.   3)  Nodular infiltrate on CXR, granulomas on chest CT.   4)  MDD with psychotic features.  Prob OK to DC home once medically cleared (see psychiatry note 4/8/18).     PLAN:   1)  Continue to monitor clinically off ABs.  2)  SL IV.  3)  Trend labs.  4)  ID opinion.  5)  Monitor clinically.  6)  Psyche f/u 4/10 to clear for DC.   Disposition Plan   Expected discharge in 1-2 days to home once medically stable.     Entered: Jw Lin 04/09/2018, 1:37 PM              Attestation:  I have reviewed today's vital signs, notes, medications, labs and imaging.     Jw Lin MD

## 2018-04-09 NOTE — CONSULTS
Consult Date:  04/08/2018      REASON FOR CONSULTATION:  The Medicine Service requested a consultation to assess whether this patient would need to return to Psychiatry after her medical issues are addressed.      HISTORY OF PRESENT ILLNESS:  The patient is a 62-year-old female who was admitted on 03/24/2018 under the care of Dr. Ventura to Psychiatry.  At the time of admission, she was hearing voices telling her to kill herself.  She has had a course of psychiatric illness with spells of hearing voices telling her to kill herself.  She had taken a bottle of trazodone in an overdose attempt.  The patient has a history of previous psychiatric hospitalizations, had been on lithium in the past, Abilify, Zyprexa when she was discharged from her in 2014.  She has had multiple psychiatric hospitalizations as documented in    Dr. Ventura's initial psychiatric assessment.        On admission, Dr. Ventura's impression was major depressive disorder, recurrent, severe with psychotic features.  The patient was started on a regimen including clozapine and lithium with some p.r.n. Zyprexa, also trazodone as a regular dose and a p.r.n. dose and gabapentin b.i.d.  In Dr. Ventura's note of 04/04/2018, he felt the patient would need approximately 3-4 more days to ensure her safety at home.  At that time, he commented that she had no delusions or hallucinations.  Thoughts were logical.  She was not suicidal.  Review of psychiatric note of Sumi Brewer from 04/05/2018 documents that the patient was not hearing voices, was ready for discharge, but at that time commented on her physical symptoms.  Her affect was described as appropriate.  She had linear and goal-directed thinking.  She had no suicidal ideation and no hallucinations.  Judgment was intact.  Insight was good.  The plan at that time was to decrease her Clozaril to 100 mg at bedtime.  Continue gabapentin, trazodone, lithium, as well as p.r.n. of Zyprexa, trazodone and  "hydroxyzine.  The plan was for the patient to be discharged to home.      The patient began to have medical symptoms, was transferred to the Medicine Service under the care of    Dr. Lin.  She had complained of feeling \"lousy\", had a temperature, had a possible lower lobe infiltrate and had diarrhea.  She was admitted to the Medicine Service.  Per Dr. Lin's note of today, he does not feel that she has pneumonia after reviewing CT of the chest.  He describes her as having a febrile illness, exact etiology unclear, pneumonia, less likely, possible diarrheal illness. He did not feel there was any indication of NMS, possible viral gastroenteritis.  Plan was to discontinue antibiotics, provide fluid support and get up with assistance and follow labs.  He expected the patient to be discharged in 1-2 days to home versus psychiatry once medically stable.      PAST PSYCHIATRIC HISTORY:  See above.      CHEMICAL DEPENDENCY HISTORY:  Negative.      SOCIAL HISTORY:  The patient lives with her brother.  She is not working.        PAST MEDICAL HISTORY:  Described as unremarkable in previous records.      MEDICATIONS:  Reviewed per Epic, currently is on Eskalith 400 mg per day, trazodone 100 mg at bedtime, trazodone 50 mg at bedtime p.r.n., Atarax 50 mg q. 6 hours p.r.n., clozapine 100 mg per day, olanzapine 10 mg q. 6 hours p.r.n. for psychosis.      FAMILY HISTORY:  Negative for psychiatric disorder.      REVIEW OF SYSTEMS:  A 10-point review of systems is performed.  The patient denies chest pain, shortness of breath.  She does state she is having some diarrhea and abdominal cramping.  She has had a headache recently.  She denies localizing neurological symptoms.  She does state she feels feverish at times.  Denies nausea and vomiting.  The rest of 10-point review of systems is negative.      VITAL SIGNS:  Blood pressure 130/74, pulse 96, temperature 97, respirations 18.      MENTAL STATUS EXAMINATION:     General " appearance and behavior:  The patient is a casually dressed, engageable woman.  She appears to be a good historian.   Mood and affect:  The patient denies marked depression, but she states she is somewhat upset because of having to go to Medicine, but does understand this.  Her affect is blunted.   Thought processes and associations are within normal limits.  No loosening of associations.   Thought content:  Denies auditory or visual hallucinations, denies suicidal ideation.     Speech and language is appropriate.     Attention and concentration are intact.     The patient is alert and oriented x3.  Recent and remote memory appear intact.  Fund of knowledge appears average.   Judgment and insight appear good in that she understands the need for treatment and psychiatric followup.  Muscle bulk and tone are normal.   Abnormal movements:  None noted.      IMPRESSION:  The patient is a 62-year-old female who was admitted with a diagnosis of major depression with psychotic features.  As she was stabilizing psychiatrically, she developed some physical symptoms and was transferred to Medicine.  Currently, she is being treated for diarrhea and febrile illness.  Per Dr. Lin, he expects her to be on the Medicine Service for 1-2 days more for stabilization.        Per Psychiatry, she had been ready to be discharged and she likely will be able to go home from the Medical Unit if she maintains psychiatric stability as she currently has.      DSM DIAGNOSES:  Major depression with psychotic features.      TREATMENT RECOMMENDATIONS:   1.  Continue current psychotropic medication regimen.   2.  The patient will likely be able to go home from a psychiatric perspective.  She appears quite stable, is not psychotic, her depression has readmitted.   3.  Would consider either a psychiatric assessment or contacting either Dr. Ventura or ADOLFO Pastrana, who is taking care of the patient at time of discharge to [facility] and  make sure that she receives the appropriate medications and followup.   4.  Please call or reconsult with any questions, concerns or change in the patient's status.  My number is [phone].         ETHAN MCCABE MD             D: 2018   T: 2018   MT: HARLEEN      Name:     GREYSON WATTS   MRN:      [id number]        Account:       [id number]   :      1955           Consult Date:  2018      Document: [id number]

## 2018-04-10 LAB
CRP SERPL-MCNC: 84.7 MG/L (ref 0–8)
ERYTHROCYTE [DISTWIDTH] IN BLOOD BY AUTOMATED COUNT: 14.8 % (ref 10–15)
HCT VFR BLD AUTO: 32.6 % (ref 35–47)
HGB BLD-MCNC: 10.6 G/DL (ref 11.7–15.7)
MCH RBC QN AUTO: 31.1 PG (ref 26.5–33)
MCHC RBC AUTO-ENTMCNC: 32.5 G/DL (ref 31.5–36.5)
MCV RBC AUTO: 96 FL (ref 78–100)
PLATELET # BLD AUTO: 248 10E9/L (ref 150–450)
RBC # BLD AUTO: 3.41 10E12/L (ref 3.8–5.2)
WBC # BLD AUTO: 8.6 10E9/L (ref 4–11)

## 2018-04-10 PROCEDURE — 25000132 ZZH RX MED GY IP 250 OP 250 PS 637: Performed by: INTERNAL MEDICINE

## 2018-04-10 PROCEDURE — 99231 SBSQ HOSP IP/OBS SF/LOW 25: CPT | Performed by: PSYCHIATRY & NEUROLOGY

## 2018-04-10 PROCEDURE — 86140 C-REACTIVE PROTEIN: CPT | Performed by: INTERNAL MEDICINE

## 2018-04-10 PROCEDURE — 36415 COLL VENOUS BLD VENIPUNCTURE: CPT | Performed by: INTERNAL MEDICINE

## 2018-04-10 PROCEDURE — 85027 COMPLETE CBC AUTOMATED: CPT | Performed by: INTERNAL MEDICINE

## 2018-04-10 PROCEDURE — 12000001 ZZH R&B MED SURG/OB UMMC

## 2018-04-10 PROCEDURE — 99232 SBSQ HOSP IP/OBS MODERATE 35: CPT | Performed by: INTERNAL MEDICINE

## 2018-04-10 RX ADMIN — ACETAMINOPHEN 650 MG: 325 TABLET ORAL at 08:06

## 2018-04-10 RX ADMIN — TRAZODONE HYDROCHLORIDE 100 MG: 100 TABLET ORAL at 21:37

## 2018-04-10 RX ADMIN — GABAPENTIN 300 MG: 300 CAPSULE ORAL at 21:34

## 2018-04-10 RX ADMIN — LITHIUM CARBONATE 450 MG: 450 TABLET, EXTENDED RELEASE ORAL at 08:06

## 2018-04-10 RX ADMIN — GABAPENTIN 300 MG: 300 CAPSULE ORAL at 08:06

## 2018-04-10 RX ADMIN — Medication 250 MG: at 08:55

## 2018-04-10 RX ADMIN — CLOZAPINE 100 MG: 100 TABLET ORAL at 21:34

## 2018-04-10 NOTE — PROGRESS NOTES
Pt seen, case reviewed with team and with brother    Pt feels well today, except for a mild HA this am  She denies abd pain, nausea or diarrhea  She denies cough, chest pain, dysuria  Appetite is fair    T max 100.3 last pm, afebrile since  VSS  Alert, fully oriented, pleasant  Lungs clear  CV rrr no M  Abd soft, non-tender  No LE edema  No rash  No rigidity or tremor      CBC nl  CRP 84.7 (69.6)  CBC nl  All cultures neg      Assessment    Febrile illness, with initial GI symptoms, (diarrhea), overall improved. Abd exam benign, now afebrile. CRP rise is perplexing. Still suspect GI infection as cause for fever, less likely med related (ie Clozaril)    Depression with psychosis, stable on current medications    Plan  Continue to monitor clinical status off antibiotics  Repeat CRP tomorrow  Possible d.c to home with brother tomorrow--Brother is agreeable to this plan  ID f/u

## 2018-04-10 NOTE — PLAN OF CARE
Problem: Patient Care Overview  Goal: Interdisciplinary Rounds/Family Conf  Outcome: Improving    VS: VSS, she is able to make her needs known   O2: She is on room air, lungs are clear   Output: She is able to get up to the bathroom, voiding in good amounts   Last BM: 4/9/2018   Activity: Up ad cayla in her room, assist to ambulate in the hallway   Skin: intact   Pain: She denies any pain   CMS: intact   Dressing: none   Diet: Regular tolerating it well   LDA: PIV, she did pull it out, report given to oncoming nurse   Equipment: IV pole   Plan: To have her seen by ID    Additional Info:

## 2018-04-10 NOTE — CONSULTS
[facility] ID SERVICE: NEW CONSULTATION   Elma Huntley : 1955 Sex: female:   Medical record number [id number]  Date of Admission: 2018  Consult Requester:Bry Siu MD  Date of Service: 2018    REASON FOR CONSULTATION:     PROBLEM LIST: (New and established)  1. Fever, unclear etiology. Pulmonary infx seems quite unlikely and I agree with stopping empiric therapy for pna. Viral enteritis a possibility which would be self-limiting. If fever persists I would consider CT abdomen looking for an intraabdominal abscess. Fever from Clozaril would be a dx of exclusion; fever from lithium seems unlikely given sub-therapeutic levels  2. MDD - stable on new psychiatric regimen which includes lithium and clozaril  3. Diarrhea, resolved.    RECOMMENDATIONS:   1. Agree with watching off antibiotics  2. Low threshold for CT abdomen/pelvis with IV/oral contrast, especially if she continues to have fever  3. If fever does not resolve by CT is normal, could cautiously presume drug fever, but again this would be a dx of exclusion.  Thanks for this consult. ID will follow with you.  Vandana Bergeron MD   of Medicine, Division of Infectious Diseases  [facility] [phone]      HPI: (Extended HPI: Requires four HPI elements or the status of three chronic problems)  This is a 62-year-old woman who was admitted to [facility] psychiatric unit on 3/24/18 with major depression and auditory hallucinations that were prompting suicidal ideation.  She had a questionable history of possibly loose stools at the time of her admission.  She was started on lithium and Clazuril.  From a psychiatric standpoint, she was stable, however on  she had the onset of nausea with diarrhea and accompanying fever to 102.3F with shaking chills.  Her CRP was 47.6.  She was transferred from the psychiatric unit to the acute care unit.  A chest x-ray revealed some apical opacities that on CT coincide with the appearance of old  calcified granulomas.  She was placed on empiric ceftriaxone and azithromycin from 4/6 until 4/8, when her CT was performed which made less likely the presence of pulmonary infection.  She notes that today, with the discontinuation of antibiotics, her diarrhea has resolved.  Her T-max the last 24 hours has been 101.4F; she had been afebrile during the prior 24 hour period.  Her affect is a bit flat and she is not the most animated historian.  She reports ongoing, mild chills. She denies vision change, cough, chest pain, skin changes, joint swelling, abdominal pain, urinary change.  All available records were reviewed and summarized above.  This ID consultation question represents a new problem, with additional work-up planned and described in the recommendations section above.  ANTI-INFECTIVES:   Current: none  Previous: CTX, azithro  Other current medications were reviewed with an eye on potential drug-drug interactions.  ROS: (Recommend ? 10 systems)   A ten point review of systems was obtained and was negative with the exception of that which is described in the HPI.  PMH: (At least ONE specific item from THREE of the three components of PFSH must be documented for a Complete PFSH)  No past medical history on file.  No past surgical history on file.    MDD with psychotic features - please see psych H/P from 3/24 for additional details including prior overdose attempts  Medication allergies were reviewed. Notable allergies include: NKDA  SOCIAL HISTORY AND RISK FACTORS   Social History   Substance Use Topics     Smoking status: Not on file     Smokeless tobacco: Not on file     Alcohol use Not on file     History   Sexual Activity     Sexual activity: Not on file     Lives with brother who makes decisions for her.  FAMILY HISTORY:   Reviewed and non-contributory. Denies history of relatives with colonic abnormalities/cancer  No family history on file.    EXAMINATION: (Recommend ? 9 systems; 2 items each)   BP  124/78 (BP Location: Right arm)  Pulse 109  Temp 100.3  F (37.9  C) (Oral)  Resp 16  SpO2 97%  GENERAL:  well-developed, well-nourished, in bed in no acute distress.   EYES:  Eyes have anicteric sclerae without conjunctival injection or stigmata of endocarditis.    ENT:  Atraumatic. Oropharynx is moist without exudates or ulcers. Normal hard and soft palate  NECK:  Supple. No cervical lymphadenopathy  LUNGS:  Clear to auscultation bilateral. Respiratory effort is normal  CARDIOVASCULAR:  Mildly tachy  ABDOMEN:  Normal bowel sounds, soft, nontender. No appreciable hepatosplenomegaly  SKIN:  No acute rashes.  Line(s) are in place without any surrounding erythema or exudate. No stigmata of endocarditis.  NEUROLOGIC:  Grossly nonfocal. Active x4 extremities  PSYCH: Flat affect, alert and oriented to person, place and time    RELEVANT DATA:   Reviewed medicine test (PFTs, EKG, cardiac echo or cath): NO  BASIC LABS   The following basic labs were personally reviewed:  Inflammatory Markers    Recent Labs   Lab Test  04/09/18 0616 04/08/18 0624 04/06/18   0748  04/05/18   1416   CRP  69.6*  73.8*  62.2*  47.6*       Hematology Studies    Recent Labs   Lab Test  04/09/18 0616 04/08/18   0624 04/07/18   0705  04/06/18   0748  04/05/18   1416  04/05/18   0822  03/30/18   0745  03/24/18   1339   WBC  7.3  7.1  4.6  4.0  4.0  5.4  10.3  9.0   ANEU   --   5.3  3.2  3.0   --   3.8  8.2  7.6   AEOS   --   0.3  0.3  0.3   --   0.3  0.1  0.0   HGB  10.4*  10.9*  11.6*  11.8  12.5   --    --   11.7   MCV  96  97  98  98  99   --    --   100   PLT  205  186  158  197  206   --    --   278       Immune Globulin Studies  No lab results found.    Metabolic Studies     Recent Labs   Lab Test  04/09/18   0616 04/08/18   0624  04/07/18   0705  04/05/18   1416  05/05/14   0925  04/30/14   0737   NA  140  141  140  136   --   139   POTASSIUM  3.5  3.4  3.2*  3.7   --   4.5   CHLORIDE  108  110*  109  103   --   104   CO2  24  25   25  24   --   26   BUN  4*  6*  5*  12   --   21   CR  0.43*  0.43*  0.41*  0.50*  0.50*  0.50*   GFRESTIMATED  >90  >90  >90  >90  >90  >90       Hepatic Studies    Recent Labs   Lab Test  04/05/18   1416   BILITOTAL  0.2   ALKPHOS  88   ALBUMIN  3.0*   AST  12   ALT  8       Thyroid Studies    Recent Labs   Lab Test  04/06/18   0748  04/19/14   0746   TSH  2.89  3.15       MICROBIOLOGY LABS  The following microbiology studies were personally reviewed:  Culture Micro   Date Value Ref Range Status   04/06/2018 <10,000 colonies/mL  mixed urogenital hansa    Final   04/05/2018 No growth after 4 days  Preliminary   04/05/2018 No growth after 4 days  Preliminary   04/16/2014   Final    >100,000 colonies/mL Mixed gram negative and positive hansa  Multiple species present, probable perineal contamination.  Susceptibility testing not routinely done         Urine Studies    Recent Labs   Lab Test  04/06/18   0845  04/04/18   1621  04/24/14   1415  04/16/14   2120   LEUKEST  Negative  Negative  Trace*  Large*   WBCU  1  0  1  91*     IMAGING RESULTS  The following imaging studies were independently reviewed:    CXR and CT chest - see HPI for interpretation

## 2018-04-10 NOTE — CONSULTS
This is for a psychiatric consult follow-up    [facility], [place]   Psychiatric Consult Follow-up note        Interim History:   I spoke with Dr. Medina to discuss the case. He indicated patient is likely to discharge tomorrow from a medical stability standpoint. He would like recommendations for medications and follow-up.    Upon interview, the patient indicates that she sees a provider at [facility]. She denies depressive symptoms, hallucinations, or suicidal thoughts. She is oriented to person, place and date. She is excited that she may be finally leaving.           Medications:       saccharomyces boulardii  250 mg Oral BID     gabapentin  300 mg Oral BID     lithium  450 mg Oral Daily     traZODone  100 mg Oral At Bedtime     cloZAPine  100 mg Oral At Bedtime          Allergies:   No Known Allergies       Labs:     Recent Results (from the past 48 hour(s))   Lactic acid level STAT for sepsis protocol    Collection Time: 04/08/18  5:35 PM   Result Value Ref Range    Lactate for Sepsis Protocol 1.6 0.4 - 1.9 mmol/L   Basic metabolic panel    Collection Time: 04/09/18  6:16 AM   Result Value Ref Range    Sodium 140 133 - 144 mmol/L    Potassium 3.5 3.4 - 5.3 mmol/L    Chloride 108 94 - 109 mmol/L    Carbon Dioxide 24 20 - 32 mmol/L    Anion Gap 8 3 - 14 mmol/L    Glucose 91 70 - 99 mg/dL    Urea Nitrogen 4 (L) 7 - 30 mg/dL    Creatinine 0.43 (L) 0.52 - 1.04 mg/dL    GFR Estimate >90 >60 mL/min/1.7m2    GFR Estimate If Black >90 >60 mL/min/1.7m2    Calcium 8.0 (L) 8.5 - 10.1 mg/dL   CBC with platelets    Collection Time: 04/09/18  6:16 AM   Result Value Ref Range    WBC 7.3 4.0 - 11.0 10e9/L    RBC Count 3.32 (L) 3.8 - 5.2 10e12/L    Hemoglobin 10.4 (L) 11.7 - 15.7 g/dL    Hematocrit 32.0 (L) 35.0 - 47.0 %    MCV 96 78 - 100 fl    MCH 31.3 26.5 - 33.0 pg    MCHC 32.5 31.5 - 36.5 g/dL    RDW 14.5 10.0 - 15.0 %    Platelet Count 205 150 - 450 10e9/L   CRP inflammation     Collection Time: 04/09/18  6:16 AM   Result Value Ref Range    CRP Inflammation 69.6 (H) 0.0 - 8.0 mg/L   CBC with platelets    Collection Time: 04/10/18  5:49 AM   Result Value Ref Range    WBC 8.6 4.0 - 11.0 10e9/L    RBC Count 3.41 (L) 3.8 - 5.2 10e12/L    Hemoglobin 10.6 (L) 11.7 - 15.7 g/dL    Hematocrit 32.6 (L) 35.0 - 47.0 %    MCV 96 78 - 100 fl    MCH 31.1 26.5 - 33.0 pg    MCHC 32.5 31.5 - 36.5 g/dL    RDW 14.8 10.0 - 15.0 %    Platelet Count 248 150 - 450 10e9/L   CRP inflammation    Collection Time: 04/10/18  5:49 AM   Result Value Ref Range    CRP Inflammation 84.7 (H) 0.0 - 8.0 mg/L          Psychiatric Examination:     /62  Pulse 108  Temp 98.4  F (36.9  C)  Resp 16  SpO2 97%  Weight is 0 lbs 0 oz  There is no height or weight on file to calculate BMI.         Appearance: awake, alert, adequately groomed and dressed in hospital scrubs  Attitude:  cooperative  Eye Contact:  good  Mood:  good  Affect:  appropriate and in normal range and mood congruent  Speech:  clear, coherent  Language: fluent and intact in English  Psychomotor, Gait, Musculoskeletal:  no evidence of tardive dyskinesia, dystonia, or tics  Throught Process:  logical, linear and goal oriented  Associations:  no loose associations  Thought Content:  no evidence of suicidal ideation or homicidal ideation and no evidence of psychotic thought  Insight:  good  Judgement:  intact  Oriented to:  time, person, and place  Attention Span and Concentration:  intact  Recent and Remote Memory:  intact  Fund of Knowledge:  appropriate          Diagnoses:   Major depressive disorder, severe, with psychotic features         Assessment & Plan:   Assessment and hospital summary:  The patient has shown improvement from a psychiatric standpoint and does not require inpatient admission when primary team has determined her to be medically stable. She should follow-up with her outpatient provider. I recommend to continue with current  medications.    Follow-up Recommendation:  Patient has provider currently. She will need to have appointment scheduled to see them as she can only get 1 week of clozapine at a time. Clinic may allow weekly lab draws until able to get in for follow-up. Recommend /social work arrange this prior to discharge as this is a new medication for the patient.  [facility]  [address], [place] [address]   [phone]

## 2018-04-10 NOTE — PLAN OF CARE
Problem: Patient Care Overview  Goal: Plan of Care/Patient Progress Review  Outcome: Improving    VS: stable   O2: RA   Output: adequate   Last BM: 4/10/18   Activity: Up independently in the room   Skin: intact   Pain: Denies    CMS: intact   Dressing: NA   Diet: regular   LDA: none   Equipment:    Plan: D/c home tomorrow?   Additional Info: Pt denies SI. Denies being lightheaded. Tolerating diet. Voiding independently in the room. Tylenol 650 mg po given for headache with relief. Resting between cares. Will continue to monitor.

## 2018-04-10 NOTE — PROGRESS NOTES
[facility] [place] [facility] SERVICE: FOLLOW UP  Elma Huntley : 1955 Sex: female:   Medical record number [id number]  Date of Admission: 2018  Consult Requester:Bry Siu MD  Date of Service: 4/10/2018      PROBLEM LIST:   1. Fever, unclear etiology: With now downtrending fever curve without much compelling symptomology (other than diarrhea which actually seems to have been due to antibiotics rather than preceding), CRP uptrending now however unclear significance. Note pulmonary nodules on chest CT, however in absence of symptoms a pulmonary infection seems unlikely. Patient without significant TB or fungal exposure history. Overall she is feeling better without much intervention. At this point suspect either self-limited viral illness vs possible Clozapine-associated fever (Clozapine induced benign fever is a common side effect, with rates reported from 0.5-55%; and actually there are case reports of fever with associated CRP elevations which resolved after Clozapine was discontinued) vs some perhaps occult intra-abdominal infection (however she is asymptomatic and exam is benign). At this point would favor to monitor another night; if patient remains afebrile it would be reasonable to proceed with discharge tomorrow, perhaps with close monitoring of temperature at home. If she spikes again, will likely recommend CT of abdomen/pelvis with potential additional workup.    2. MDD - stable on new psychiatric regimen which includes lithium and clozaril  3. Diarrhea, resolved (suspect antibiotic-associated).  4. Pulmonary nodules    RECOMMENDATIONS:   1. Agree with watching off antibiotics.  2. Low threshold for CT abdomen/pelvis with IV/oral contrast, if she spikes another fever.     ID will follow with you. Patient seen and discussed with ID staff Dr. Bergeron.  Daysi Zamora MD - N ID fellow  [phone]    SUBJECTIVE/INTEVAL HISTORY:  -Tmax 100.3 overnight  -CRP uptrending to 84.7  Elma is feeling overall well  today; she had a mild frontal headache this morning which resolved with tylenol. No diarrhea, abdominal pain, rashes, shortness of breath, joint pains, dysuria.     ANTI-INFECTIVES:   Ceftriaxone 4/6-8  Azithromycin 4/6-8    EXAMINATION:   /62  Pulse 108  Temp 98.4  F (36.9  C)  Resp 16  SpO2 97%  GENERAL:  well-developed, well-nourished, in bed in no acute distress.   EYES:  Eyes have anicteric sclerae  ENT:  Atraumatic. Oropharynx is moist without exudates or ulcers.   NECK:  Supple. No cervical lymphadenopathy  LUNGS:  Clear to auscultation bilateral. Respiratory effort is normal  CARDIOVASCULAR:  Regular rhythm, rapid rate; gallop heard at LLSB, no murmurs heard  ABDOMEN:  Normal bowel sounds, soft, nontender. No appreciable hepatosplenomegaly  SKIN:  No acute rashes. No IVs in place.   NEUROLOGIC:  Grossly nonfocal. Active x4 extremities  PSYCH: Flat affect, alert and oriented to person, place and time    RELEVANT DATA:   BASIC LABS   The following basic labs were personally reviewed:  Inflammatory Markers    Recent Labs   Lab Test  04/10/18   0549  04/09/18   0616  04/08/18   0624  04/06/18   0748  04/05/18   1416   CRP  84.7*  69.6*  73.8*  62.2*  47.6*       Hematology Studies    Recent Labs   Lab Test  04/10/18   0549  04/09/18   0616  04/08/18   0624  04/07/18   0705  04/06/18   0748  04/05/18   1416  04/05/18   0822  03/30/18   0745  03/24/18   1339   WBC  8.6  7.3  7.1  4.6  4.0  4.0  5.4  10.3  9.0   ANEU   --    --   5.3  3.2  3.0   --   3.8  8.2  7.6   AEOS   --    --   0.3  0.3  0.3   --   0.3  0.1  0.0   HGB  10.6*  10.4*  10.9*  11.6*  11.8  12.5   --    --   11.7   MCV  96  96  97  98  98  99   --    --   100   PLT  248  205  186  158  197  206   --    --   278       Immune Globulin Studies  No lab results found.    Metabolic Studies     Recent Labs   Lab Test  04/09/18   0616  04/08/18   0624  04/07/18   0705  04/05/18   1416  05/05/14   0925  04/30/14   0737   NA  140  141  140  136    --   139   POTASSIUM  3.5  3.4  3.2*  3.7   --   4.5   CHLORIDE  108  110*  109  103   --   104   CO2  24  25  25  24   --   26   BUN  4*  6*  5*  12   --   21   CR  0.43*  0.43*  0.41*  0.50*  0.50*  0.50*   GFRESTIMATED  >90  >90  >90  >90  >90  >90       Hepatic Studies    Recent Labs   Lab Test  04/05/18   1416   BILITOTAL  0.2   ALKPHOS  88   ALBUMIN  3.0*   AST  12   ALT  8       Thyroid Studies    Recent Labs   Lab Test  04/06/18   0748  04/19/14   0746   TSH  2.89  3.15       MICROBIOLOGY LABS  The following microbiology studies were personally reviewed:  Culture Micro   Date Value Ref Range Status   04/06/2018 <10,000 colonies/mL  mixed urogenital hansa    Final   04/05/2018 No growth after 5 days  Preliminary   04/05/2018 No growth after 5 days  Preliminary   04/16/2014   Final    >100,000 colonies/mL Mixed gram negative and positive hansa  Multiple species present, probable perineal contamination.  Susceptibility testing not routinely done         Urine Studies    Recent Labs   Lab Test  04/06/18   0845  04/04/18   1621  04/24/14   1415  04/16/14   2120   LEUKEST  Negative  Negative  Trace*  Large*   WBCU  1  0  1  91*     IMAGING RESULTS  The following imaging studies were independently reviewed:    4/5/18 CXR:   Ill-defined nodular opacities most prominent in the rightbase. Differential would include infection. Consider chest CT forfurther evaluation.    4/7/18 CT chest:   1. Multiple calcified granulomas in the right lung, correlating withnodular opacities on prior radiograph.  2. Biapical pleural-parenchymal scarring, greatest on the right.  3. Nonspecific right axillary lymphadenopathy may be reactive.

## 2018-04-10 NOTE — PLAN OF CARE
Problem: Patient Care Overview  Goal: Plan of Care/Patient Progress Review  Outcome: Improving  Patient A/Ox4. VSS on eves, pt stated she will call if she needs anything overnight, did not want to be woken for VS. Denies CP, SOB, dizziness/LH. LSCTA. +fl/BS. Voiding well in bathroom independently. CMS intact. Tolerating regular diet without NV. Activity level is good, pt up independently in room. IV came out at 1900, pt did not want another in and orders from Dr. Lin's note said to SL patient. Pain rated as well managed throughout shift, pt had tylenol PRN on previous shift and declined anything more. Patient has demonstrated ability to call appropriately. Patient is resting with call light within reach. Will continue to monitor.

## 2018-04-11 VITALS
OXYGEN SATURATION: 92 % | DIASTOLIC BLOOD PRESSURE: 63 MMHG | TEMPERATURE: 98.2 F | SYSTOLIC BLOOD PRESSURE: 104 MMHG | HEART RATE: 100 BPM | RESPIRATION RATE: 16 BRPM

## 2018-04-11 LAB
BACTERIA SPEC CULT: NO GROWTH
BACTERIA SPEC CULT: NO GROWTH
BASOPHILS # BLD AUTO: 0 10E9/L (ref 0–0.2)
BASOPHILS NFR BLD AUTO: 0.3 %
CRP SERPL-MCNC: 85.4 MG/L (ref 0–8)
DIFFERENTIAL METHOD BLD: ABNORMAL
EOSINOPHIL # BLD AUTO: 0.5 10E9/L (ref 0–0.7)
EOSINOPHIL NFR BLD AUTO: 5.8 %
ERYTHROCYTE [DISTWIDTH] IN BLOOD BY AUTOMATED COUNT: 14.8 % (ref 10–15)
HCT VFR BLD AUTO: 32.2 % (ref 35–47)
HGB BLD-MCNC: 10.3 G/DL (ref 11.7–15.7)
IMM GRANULOCYTES # BLD: 0.1 10E9/L (ref 0–0.4)
IMM GRANULOCYTES NFR BLD: 0.7 %
LYMPHOCYTES # BLD AUTO: 1.4 10E9/L (ref 0.8–5.3)
LYMPHOCYTES NFR BLD AUTO: 16.5 %
Lab: NORMAL
Lab: NORMAL
MCH RBC QN AUTO: 30.6 PG (ref 26.5–33)
MCHC RBC AUTO-ENTMCNC: 32 G/DL (ref 31.5–36.5)
MCV RBC AUTO: 96 FL (ref 78–100)
MONOCYTES # BLD AUTO: 0.7 10E9/L (ref 0–1.3)
MONOCYTES NFR BLD AUTO: 7.7 %
NEUTROPHILS # BLD AUTO: 5.9 10E9/L (ref 1.6–8.3)
NEUTROPHILS NFR BLD AUTO: 69 %
NRBC # BLD AUTO: 0 10*3/UL
NRBC BLD AUTO-RTO: 0 /100
PLATELET # BLD AUTO: 288 10E9/L (ref 150–450)
RBC # BLD AUTO: 3.37 10E12/L (ref 3.8–5.2)
SPECIMEN SOURCE: NORMAL
SPECIMEN SOURCE: NORMAL
WBC # BLD AUTO: 8.6 10E9/L (ref 4–11)

## 2018-04-11 PROCEDURE — 85025 COMPLETE CBC W/AUTO DIFF WBC: CPT | Performed by: INTERNAL MEDICINE

## 2018-04-11 PROCEDURE — 36415 COLL VENOUS BLD VENIPUNCTURE: CPT | Performed by: INTERNAL MEDICINE

## 2018-04-11 PROCEDURE — 25000132 ZZH RX MED GY IP 250 OP 250 PS 637: Performed by: INTERNAL MEDICINE

## 2018-04-11 PROCEDURE — 86140 C-REACTIVE PROTEIN: CPT | Performed by: INTERNAL MEDICINE

## 2018-04-11 PROCEDURE — 99238 HOSP IP/OBS DSCHRG MGMT 30/<: CPT | Performed by: INTERNAL MEDICINE

## 2018-04-11 RX ORDER — GABAPENTIN 300 MG/1
300 CAPSULE ORAL 2 TIMES DAILY
Qty: 60 CAPSULE | Refills: 3 | Status: SHIPPED | OUTPATIENT
Start: 2018-04-11

## 2018-04-11 RX ORDER — CLOZAPINE 50 MG/1
100 TABLET ORAL AT BEDTIME
Qty: 14 TABLET | Refills: 0 | Status: SHIPPED | OUTPATIENT
Start: 2018-04-11

## 2018-04-11 RX ORDER — TRAZODONE HYDROCHLORIDE 100 MG/1
100 TABLET ORAL AT BEDTIME
Qty: 30 TABLET | Refills: 3 | Status: SHIPPED | OUTPATIENT
Start: 2018-04-11

## 2018-04-11 RX ORDER — LITHIUM CARBONATE 450 MG
450 TABLET, EXTENDED RELEASE ORAL DAILY
Qty: 30 TABLET | Refills: 3 | Status: SHIPPED | OUTPATIENT
Start: 2018-04-11

## 2018-04-11 RX ADMIN — LITHIUM CARBONATE 450 MG: 450 TABLET, EXTENDED RELEASE ORAL at 07:59

## 2018-04-11 RX ADMIN — GABAPENTIN 300 MG: 300 CAPSULE ORAL at 07:59

## 2018-04-11 RX ADMIN — Medication 250 MG: at 07:59

## 2018-04-11 NOTE — PLAN OF CARE
Problem: Patient Care Overview  Goal: Individualization & Mutuality  Pt A&O x's 4. VSS, BP was low this morning, denies dizziness or lightheadedness. Po fluid offered, BP rechecked again and it improved.  Afebrile. 02 sats in the 90s on RA.  Lungs clear. Denies SOB, CP and nausea. Tolerating regular diet. Bowel sound active in all quadrants. No BM but passing gas. Voiding adequately into the toilet.  Denies pain. CMS intact, denies N/T. Pt slept thought the night and is able to make needs known, call light with in reach. Will continue to monitor.

## 2018-04-11 NOTE — PROGRESS NOTES
Care Coordinator- Discharge Planning     Admission Date/Time:  4/6/2018  Attending MD:  Bry Siu MD     Data  Date of initial CC assessment:  4/11/2018  Chart reviewed, discussed with interdisciplinary team.   Patient was admitted for:   1. Severe recurrent major depression with psychotic features (H)    2. Vitamin D deficiency         Assessment  Concerns with insurance coverage for discharge needs: None.  Current Living Situation: Patient lives alone.  Support System: Involved  Services Involved: NA  Transportation: Family or Friend will provide  Barriers to Discharge: None      Coordination of Care and Referrals: An appointment was made at [facility] [place] with Allie on April 19 at 12:20, for a medication change evaluation. CC will continue to follow as needed.      Plan  Anticipated Discharge Date:  4/11/2018  Anticipated Discharge Plan:  Home    CTS Handoff completed:  OPAL Caruso RN, BSN  Care Coordinator, 8A  Phone [phone]  Pager [phone]

## 2018-04-11 NOTE — PROGRESS NOTES
Pt feels well  No GI complaints  Pt is anxious for d/c    VSS    Afebrile  Alert, fully oriented  Lungs clear  CV rrr  Abd soft      CRP 85.4 (84.7)      Assessment    Fevers, diarrhea, resolved. Likely viral.  Unclear significance of continued elevation of CRP. Given Pt's improved clinical status, neg eval to date, there seems to be reason to keep Pt in hospital    Depression with psychosis, improved    Plan  D/c to home today, with close mental health f/u

## 2018-04-11 NOTE — PROGRESS NOTES
VS: Stable   Output: voiding   Last BM: 4/10/18   Activity: independent   Skin: WNL   Pain: denies   Diet: reg   Plan: Home - self care.  Adequate for discharge

## 2018-04-11 NOTE — DISCHARGE SUMMARY
Admit Date:     04/06/2018   Discharge Date:     04/11/2018      HISTORY:  Elma Huntley is a 62-year-old female with a history of depression who was transferred from the inpatient geriatric psychiatry unit to the medical unit for evaluation of elevated temperature of 102.3.      Assessment on admission to the medical unit was that of a possible pneumonia.  The patient also had described an episode of diarrhea with nausea on the day of transfer.  Laboratory studies on admission were remarkable for an elevated lactate of 2.5.  White count was normal.      HOSPITAL COURSE:  The patient was admitted to the medical unit, was given intravenous fluids and monitored closely.  She initially was started on broad-spectrum antibiotics for possible pneumonia.  A CT scan of the chest was subsequently obtained and revealed no evidence of pneumonia, but the urine culture and blood cultures were negative.        The patient's course was marked by gradual improvement in her GI symptoms of nausea and diarrhea.  She did have low-grade fevers for the first 3 days of her hospitalization, but was afebrile for approximately 48 hours prior to discharge.  As above, all cultures were negative.  Laboratory studies were remarkable for normal CBCs.  Of note is the fact the patient did have an elevated CRP of 62.2, as well as a slightly elevated procalcitonin of 0.34.  The patient's clinical status did improve.  Her CRP did remain elevated, in fact it was 85.4 the time of discharge.  In spite of the persistent elevation of CRP, the patient clinically felt quite well and as above was afebrile without GI symptoms for 48 hours prior to discharge.  Infectious disease consultation was obtained.  Their assessment was that the picture most likely was secondary to a viral gastroenteritis, less likely to an occult GI infection such as an abscess and also likely to be secondary to medication, i.e., Clozaril.      At the time of discharge the patient was  alert, fully oriented and felt quite well without any GI complaints, had been afebrile for 48 hours.  I would add that her psychiatric status remained quite stable following her transfer from geriatric psychiatry unit.  She was seen by psychiatry who recommended the continuation and the newly instituted Clozaril, but no new medication changes.      The patient will discharge to her brother's home where she lives.  She and her brother were advised to contact her primary physician or come to the ER should she experience recurrent fever or abdominal symptoms.  She does have a clinic appointment with her psychiatrist within 1 week to assess her response to the Clozaril and to monitor maintenance therapy.      DISCHARGE DIAGNOSES:   1.  Fever, nausea, vomiting and diarrhea likely representing a viral gastroenteritis.  As above, the only concern is persistently elevated CRP.   2.  Depression with psychotic features, improved on current medication regimen.      DISCHARGE MEDICATIONS:   1.  Clozapine 100 mg every evening.   2.  Trazodone 100 mg at bedtime.   3.  Vitamin D 2000 units daily.   4.  Gabapentin 300 mg twice daily.   5.  Lithium 450 mg every day.        Followup will be at the [facility] on  at 12:20 p.m.         JOVON BUCKLEY MD             D: 2018   T: 2018   MT: HARLEEN      Name:     GREYSON WATTS   MRN:      [id number]        Account:        [id number]   :      1955           Admit Date:     2018                                  Discharge Date: 2018      Document: [id number]

## 2018-04-11 NOTE — PLAN OF CARE
Problem: Patient Care Overview  Goal: Plan of Care/Patient Progress Review  VSS except HR slightly tachy.denies CP/SOB.independent in room but able to make needs known.voiding without difficulty.denies other acute complaints.plan is DC home tomorrow.follow POC.

## 2018-04-16 NOTE — DISCHARGE SUMMARY
"Admitted:     03/24/2018       HISTORY OF PRESENT ILLNESS:  Ms. Elma Huntley, date of birth, 1955, is a 62-year-old woman admitted to [facility] Psychiatry unit on 03/24/2018.  She was admitted to the hospital from [facility].       She was admitted due to hearing voices telling her to kill herself.       She has had a course of illness with recurrent spells of hearing voices telling her to kill herself, and in between bouts she is \"normal.\" according to her brother.  She had taken a bottle of trazodone in an overdose attempt in order to die prior to going to [place].       Laboratory work there showed a chemistry profile that was unremarkable, GFR was good, toxicology was unremarkable, CBC was unremarkable except for low hemoglobin and low red blood count, urine toxicology was positive for prescribed drugs.       She had been hospitalized here in 2014 and prescribed lithium.  She was doing well, but demanded the lithium be stopped because she did not like the blood draws.  She still did well after that was stopped taking Abilify, which was decreased. .  She did worse with this and developed more suicidal thinking.  The Abilify was then increased without benefit, and she was then hospitalized at [facility] for 3-1/2 weeks.  Zyprexa was increased to 35 mg per day after it had previously been lowered, she got better and in 01/2018 Zyprexa was decreased again to 30 mg.  She then was found by her brother with a bag over her head and went to [facility].  She was there 8 days, and after discharge became worse again with suicidal thinking and went to [facility] for 6 days.  Haldol was started at that time.  She then had the overdose that led to the stay here.  Therefore, she has been hospitalized in the last 6 months at 4 different hospitals.       When she had been discharged from here, she was taking Zyprexa along with the lithium.  Her current " "admission medications are:   1.  Gabapentin 300 mg twice a day.   2.  Trazodone 100 mg at bedtime.   3.  Zyprexa 20 mg twice a day.   4.  Lexapro 30 mg per day.   5.  Haldol 5 mg twice a day.   6.  Lorazepam 0.5 mg twice a day.       She tells me that she stopped the lithium because it made her not suicidal and she did not like that.  I believe this explanation is fairly suspect, however, based on her current clinical state.       MENTAL STATUS EXAMINATION:  Shows an alert woman sitting up in bed.  She has decreased facial movements and a \"walleyed stare.\"         There is mild cogwheel rigidity.  There is no tremor.  No abnormal mouth movements are noted.  Speech production and spontaneity are decreased and her movements appear somewhat \"waxy.\"       She states that she is hearing several voices telling her to die and that she will die and wants to die and wants to \"just get it over with.\"  She states she has felt this way since mandeep high.  She denies a history of abuse.       Various diagnoses have been given including schizophrenia and when she was here it was major depression with psychotic features.       She is her own legal decision maker, however, she lives with her brother who basically takes care of her and makes decisions for her.       DIAGNOSTIC IMPRESSION:   Major depression, recurrent, severe with psychotic features.       PLAN:  In discussion with the brother and reviewing the medication she has taken, which also include Topamax that cause cognitive problems, the plan is to start Clozaril.  Side effects were discussed with the brother including risk of tardive dyskinesia.       Risk of lowering white blood count was also discussed.       Hospital Course: clozaril was started, Lithium was re-started.  Clozaril was increased.  The suicidal ideation gradually improved.  She was discharged home to her [facility].    Recent Results (from the past 504 hour(s))   Glucose by meter    Collection Time: " 03/26/18  4:57 PM   Result Value Ref Range    Glucose 91 70 - 99 mg/dL   Lithium level    Collection Time: 03/27/18  7:43 AM   Result Value Ref Range    Lithium Level <0.20 (L) 0.60 - 1.20 mmol/L   Glucose by meter    Collection Time: 03/27/18  7:57 AM   Result Value Ref Range    Glucose 112 (H) 70 - 99 mg/dL   Glucose by meter    Collection Time: 03/28/18  8:05 AM   Result Value Ref Range    Glucose 117 (H) 70 - 99 mg/dL   Glucose by meter    Collection Time: 03/28/18  5:30 PM   Result Value Ref Range    Glucose 94 70 - 99 mg/dL   Glucose by meter    Collection Time: 03/29/18  7:53 AM   Result Value Ref Range    Glucose 112 (H) 70 - 99 mg/dL   Glucose by meter    Collection Time: 03/29/18  5:28 PM   Result Value Ref Range    Glucose 115 (H) 70 - 99 mg/dL   WBC and differential    Collection Time: 03/30/18  7:45 AM   Result Value Ref Range    WBC 10.3 4.0 - 11.0 10e9/L    Diff Method Automated Method     % Neutrophils 79.0 %    % Lymphocytes 13.3 %    % Monocytes 6.1 %    % Eosinophils 1.0 %    % Basophils 0.2 %    % Immature Granulocytes 0.4 %    Nucleated RBCs 0 0 /100    Absolute Neutrophil 8.2 1.6 - 8.3 10e9/L    Absolute Lymphocytes 1.4 0.8 - 5.3 10e9/L    Absolute Monocytes 0.6 0.0 - 1.3 10e9/L    Absolute Eosinophils 0.1 0.0 - 0.7 10e9/L    Absolute Basophils 0.0 0.0 - 0.2 10e9/L    Abs Immature Granulocytes 0.0 0 - 0.4 10e9/L    Absolute Nucleated RBC 0.0    Lithium level    Collection Time: 03/30/18  7:45 AM   Result Value Ref Range    Lithium Level <0.20 (L) 0.60 - 1.20 mmol/L   Glucose by meter    Collection Time: 03/30/18  8:05 AM   Result Value Ref Range    Glucose 101 (H) 70 - 99 mg/dL   Glucose by meter    Collection Time: 03/30/18  5:14 PM   Result Value Ref Range    Glucose 100 (H) 70 - 99 mg/dL   Glucose by meter    Collection Time: 03/31/18  8:10 AM   Result Value Ref Range    Glucose 100 (H) 70 - 99 mg/dL   Glucose by meter    Collection Time: 03/31/18  5:19 PM   Result Value Ref Range    Glucose  97 70 - 99 mg/dL   Glucose by meter    Collection Time: 04/01/18  7:53 AM   Result Value Ref Range    Glucose 103 (H) 70 - 99 mg/dL   Glucose by meter    Collection Time: 04/01/18  5:20 PM   Result Value Ref Range    Glucose 99 70 - 99 mg/dL   Glucose by meter    Collection Time: 04/02/18  7:52 AM   Result Value Ref Range    Glucose 110 (H) 70 - 99 mg/dL   Glucose by meter    Collection Time: 04/02/18  4:44 PM   Result Value Ref Range    Glucose 99 70 - 99 mg/dL   Glucose by meter    Collection Time: 04/03/18  7:52 AM   Result Value Ref Range    Glucose 127 (H) 70 - 99 mg/dL   Glucose by meter    Collection Time: 04/03/18  5:12 PM   Result Value Ref Range    Glucose 111 (H) 70 - 99 mg/dL   UA with Microscopic reflex to Culture    Collection Time: 04/04/18  4:21 PM   Result Value Ref Range    Color Urine Yellow     Appearance Urine Cloudy     Glucose Urine Negative NEG^Negative mg/dL    Bilirubin Urine Negative NEG^Negative    Ketones Urine Negative NEG^Negative mg/dL    Specific Gravity Urine 1.029 1.003 - 1.035    Blood Urine Small (A) NEG^Negative    pH Urine 5.5 5.0 - 7.0 pH    Protein Albumin Urine 30 (A) NEG^Negative mg/dL    Urobilinogen mg/dL Normal 0.0 - 2.0 mg/dL    Nitrite Urine Negative NEG^Negative    Leukocyte Esterase Urine Negative NEG^Negative    Source Midstream Urine     WBC Urine 0 0 - 5 /HPF    RBC Urine 3 (H) 0 - 2 /HPF    Bacteria Urine Moderate (A) NEG^Negative /HPF    Mucous Urine Present (A) NEG^Negative /LPF    Amorphous Crystals Few (A) NEG^Negative /HPF   WBC and differential    Collection Time: 04/05/18  8:22 AM   Result Value Ref Range    WBC 5.4 4.0 - 11.0 10e9/L    Diff Method Automated Method     % Neutrophils 70.4 %    % Lymphocytes 13.4 %    % Monocytes 9.7 %    % Eosinophils 6.1 %    % Basophils 0.2 %    % Immature Granulocytes 0.2 %    Nucleated RBCs 0 0 /100    Absolute Neutrophil 3.8 1.6 - 8.3 10e9/L    Absolute Lymphocytes 0.7 (L) 0.8 - 5.3 10e9/L    Absolute Monocytes 0.5 0.0  - 1.3 10e9/L    Absolute Eosinophils 0.3 0.0 - 0.7 10e9/L    Absolute Basophils 0.0 0.0 - 0.2 10e9/L    Abs Immature Granulocytes 0.0 0 - 0.4 10e9/L    Absolute Nucleated RBC 0.0    Procalcitonin    Collection Time: 04/05/18  2:16 PM   Result Value Ref Range    Procalcitonin 0.29 ng/ml   CRP inflammation    Collection Time: 04/05/18  2:16 PM   Result Value Ref Range    CRP Inflammation 47.6 (H) 0.0 - 8.0 mg/L   Comprehensive metabolic panel    Collection Time: 04/05/18  2:16 PM   Result Value Ref Range    Sodium 136 133 - 144 mmol/L    Potassium 3.7 3.4 - 5.3 mmol/L    Chloride 103 94 - 109 mmol/L    Carbon Dioxide 24 20 - 32 mmol/L    Anion Gap 9 3 - 14 mmol/L    Glucose 108 (H) 70 - 99 mg/dL    Urea Nitrogen 12 7 - 30 mg/dL    Creatinine 0.50 (L) 0.52 - 1.04 mg/dL    GFR Estimate >90 >60 mL/min/1.7m2    GFR Estimate If Black >90 >60 mL/min/1.7m2    Calcium 8.2 (L) 8.5 - 10.1 mg/dL    Bilirubin Total 0.2 0.2 - 1.3 mg/dL    Albumin 3.0 (L) 3.4 - 5.0 g/dL    Protein Total 7.4 6.8 - 8.8 g/dL    Alkaline Phosphatase 88 40 - 150 U/L    ALT 8 0 - 50 U/L    AST 12 0 - 45 U/L   Lactic acid whole blood    Collection Time: 04/05/18  2:16 PM   Result Value Ref Range    Lactic Acid 1.0 0.7 - 2.0 mmol/L   CBC with platelets    Collection Time: 04/05/18  2:16 PM   Result Value Ref Range    WBC 4.0 4.0 - 11.0 10e9/L    RBC Count 3.92 3.8 - 5.2 10e12/L    Hemoglobin 12.5 11.7 - 15.7 g/dL    Hematocrit 38.7 35.0 - 47.0 %    MCV 99 78 - 100 fl    MCH 31.9 26.5 - 33.0 pg    MCHC 32.3 31.5 - 36.5 g/dL    RDW 14.5 10.0 - 15.0 %    Platelet Count 206 150 - 450 10e9/L   Blood culture    Collection Time: 04/05/18  2:16 PM   Result Value Ref Range    Specimen Description Blood Right Hand     Special Requests Aerobic and anaerobic bottles received     Culture Micro No growth    Blood culture    Collection Time: 04/05/18  2:28 PM   Result Value Ref Range    Specimen Description Blood Left Hand     Special Requests Aerobic and anaerobic  bottles received     Culture Micro No growth    EKG 12-lead, complete    Collection Time: 04/05/18  2:54 PM   Result Value Ref Range    Interpretation ECG Click View Image link to view waveform and result    Influenza A and B and RSV PCR    Collection Time: 04/05/18 10:55 PM   Result Value Ref Range    Specimen Description Nasal     Influenza A PCR Negative NEG^Negative    Influenza B PCR Negative NEG^Negative    Resp Syncytial Virus Negative NEG^Negative   Lithium level    Collection Time: 04/06/18  7:48 AM   Result Value Ref Range    Lithium Level 0.28 (L) 0.60 - 1.20 mmol/L   TSH with free T4 reflex    Collection Time: 04/06/18  7:48 AM   Result Value Ref Range    TSH 2.89 0.40 - 4.00 mU/L   CRP inflammation    Collection Time: 04/06/18  7:48 AM   Result Value Ref Range    CRP Inflammation 62.2 (H) 0.0 - 8.0 mg/L   Procalcitonin    Collection Time: 04/06/18  7:48 AM   Result Value Ref Range    Procalcitonin 0.34 ng/ml   CBC with platelets differential    Collection Time: 04/06/18  7:48 AM   Result Value Ref Range    WBC 4.0 4.0 - 11.0 10e9/L    RBC Count 3.78 (L) 3.8 - 5.2 10e12/L    Hemoglobin 11.8 11.7 - 15.7 g/dL    Hematocrit 37.1 35.0 - 47.0 %    MCV 98 78 - 100 fl    MCH 31.2 26.5 - 33.0 pg    MCHC 31.8 31.5 - 36.5 g/dL    RDW 14.2 10.0 - 15.0 %    Platelet Count 197 150 - 450 10e9/L    Diff Method Automated Method     % Neutrophils 74.9 %    % Lymphocytes 12.7 %    % Monocytes 5.7 %    % Eosinophils 6.2 %    % Basophils 0.0 %    % Immature Granulocytes 0.5 %    Nucleated RBCs 0 0 /100    Absolute Neutrophil 3.0 1.6 - 8.3 10e9/L    Absolute Lymphocytes 0.5 (L) 0.8 - 5.3 10e9/L    Absolute Monocytes 0.2 0.0 - 1.3 10e9/L    Absolute Eosinophils 0.3 0.0 - 0.7 10e9/L    Absolute Basophils 0.0 0.0 - 0.2 10e9/L    Abs Immature Granulocytes 0.0 0 - 0.4 10e9/L    Absolute Nucleated RBC 0.0    Clozapine and Metabolites Quant    Collection Time: 04/06/18  7:48 AM   Result Value Ref Range    Clozapine Quant 626 ng/mL     Norclozapine Quant 235 ng/mL    Clozapine-N-Oxide Quant <100 ng/mL    Clozapine and Metabolites Total 861 <=1500 ng/mL   UA with Microscopic reflex to Culture    Collection Time: 04/06/18  8:45 AM   Result Value Ref Range    Color Urine Yellow     Appearance Urine Cloudy     Glucose Urine Negative NEG^Negative mg/dL    Bilirubin Urine Negative NEG^Negative    Ketones Urine Negative NEG^Negative mg/dL    Specific Gravity Urine 1.027 1.003 - 1.035    Blood Urine Trace (A) NEG^Negative    pH Urine 5.5 5.0 - 7.0 pH    Protein Albumin Urine 30 (A) NEG^Negative mg/dL    Urobilinogen mg/dL Normal 0.0 - 2.0 mg/dL    Nitrite Urine Negative NEG^Negative    Leukocyte Esterase Urine Negative NEG^Negative    Source Midstream Urine     WBC Urine 1 0 - 5 /HPF    RBC Urine 3 (H) 0 - 2 /HPF    Bacteria Urine Many (A) NEG^Negative /HPF    Mucous Urine Present (A) NEG^Negative /LPF    Calcium Oxalate Few (A) NEG^Negative /HPF   Urine Culture Aerobic Bacterial    Collection Time: 04/06/18  8:45 AM   Result Value Ref Range    Specimen Description Unspecified Urine     Special Requests Specimen received in preservative     Culture Micro <10,000 colonies/mL  mixed urogenital hansa      Lactic acid whole blood    Collection Time: 04/06/18  5:29 PM   Result Value Ref Range    Lactic Acid 2.5 (H) 0.7 - 2.0 mmol/L   Basic metabolic panel    Collection Time: 04/07/18  7:05 AM   Result Value Ref Range    Sodium 140 133 - 144 mmol/L    Potassium 3.2 (L) 3.4 - 5.3 mmol/L    Chloride 109 94 - 109 mmol/L    Carbon Dioxide 25 20 - 32 mmol/L    Anion Gap 6 3 - 14 mmol/L    Glucose 103 (H) 70 - 99 mg/dL    Urea Nitrogen 5 (L) 7 - 30 mg/dL    Creatinine 0.41 (L) 0.52 - 1.04 mg/dL    GFR Estimate >90 >60 mL/min/1.7m2    GFR Estimate If Black >90 >60 mL/min/1.7m2    Calcium 7.9 (L) 8.5 - 10.1 mg/dL   CBC with platelets differential    Collection Time: 04/07/18  7:05 AM   Result Value Ref Range    WBC 4.6 4.0 - 11.0 10e9/L    RBC Count 3.73 (L) 3.8 -  5.2 10e12/L    Hemoglobin 11.6 (L) 11.7 - 15.7 g/dL    Hematocrit 36.7 35.0 - 47.0 %    MCV 98 78 - 100 fl    MCH 31.1 26.5 - 33.0 pg    MCHC 31.6 31.5 - 36.5 g/dL    RDW 14.2 10.0 - 15.0 %    Platelet Count 158 150 - 450 10e9/L    Diff Method Automated Method     % Neutrophils 69.5 %    % Lymphocytes 16.1 %    % Monocytes 7.2 %    % Eosinophils 6.1 %    % Basophils 0.2 %    % Immature Granulocytes 0.9 %    Nucleated RBCs 0 0 /100    Absolute Neutrophil 3.2 1.6 - 8.3 10e9/L    Absolute Lymphocytes 0.7 (L) 0.8 - 5.3 10e9/L    Absolute Monocytes 0.3 0.0 - 1.3 10e9/L    Absolute Eosinophils 0.3 0.0 - 0.7 10e9/L    Absolute Basophils 0.0 0.0 - 0.2 10e9/L    Abs Immature Granulocytes 0.0 0 - 0.4 10e9/L    Absolute Nucleated RBC 0.0    Lactic acid whole blood    Collection Time: 04/07/18  7:05 AM   Result Value Ref Range    Lactic Acid 0.5 (L) 0.7 - 2.0 mmol/L   Magnesium    Collection Time: 04/07/18  7:05 AM   Result Value Ref Range    Magnesium 1.8 1.6 - 2.3 mg/dL   Lactic acid level STAT for sepsis protocol    Collection Time: 04/07/18  5:28 PM   Result Value Ref Range    Lactate for Sepsis Protocol 0.7 0.4 - 1.9 mmol/L   Clostridium difficile toxin B PCR    Collection Time: 04/08/18  5:30 AM   Result Value Ref Range    Specimen Description Feces     C Diff Toxin B PCR Negative NEG^Negative   Enteric Bacteria and Virus Panel by RENATA Stool    Collection Time: 04/08/18  5:30 AM   Result Value Ref Range    Campylobacter group by RENATA Not Detected NDET^Not Detected    Salmonella species by RENATA Not Detected NDET^Not Detected    Shigella species by RENATA Not Detected NDET^Not Detected    Vibrio group by RENATA Not Detected NDET^Not Detected    Rotavirus A by RENATA Not Detected NDET^Not Detected    Shiga toxin 1 gene by RENATA Not Detected NDET^Not Detected    Shiga toxin 2 gene by RENATA Not Detected NDET^Not Detected    Norovirus I and II by RENATA Not Detected NDET^Not Detected    Yersinia enterocolitica by RENATA Not Detected NDET^Not  Detected    Enteric pathogen comment       Testing performed by multiplexed, qualitative PCR using the [employer] Enteric   Pathogens Nucleic Acid Test. Results should not be used as the sole basis for diagnosis,   treatment, or other patient management decisions.     Basic metabolic panel    Collection Time: 04/08/18  6:24 AM   Result Value Ref Range    Sodium 141 133 - 144 mmol/L    Potassium 3.4 3.4 - 5.3 mmol/L    Chloride 110 (H) 94 - 109 mmol/L    Carbon Dioxide 25 20 - 32 mmol/L    Anion Gap 6 3 - 14 mmol/L    Glucose 108 (H) 70 - 99 mg/dL    Urea Nitrogen 6 (L) 7 - 30 mg/dL    Creatinine 0.43 (L) 0.52 - 1.04 mg/dL    GFR Estimate >90 >60 mL/min/1.7m2    GFR Estimate If Black >90 >60 mL/min/1.7m2    Calcium 8.4 (L) 8.5 - 10.1 mg/dL   CRP inflammation    Collection Time: 04/08/18  6:24 AM   Result Value Ref Range    CRP Inflammation 73.8 (H) 0.0 - 8.0 mg/L   CBC with platelets differential    Collection Time: 04/08/18  6:24 AM   Result Value Ref Range    WBC 7.1 4.0 - 11.0 10e9/L    RBC Count 3.49 (L) 3.8 - 5.2 10e12/L    Hemoglobin 10.9 (L) 11.7 - 15.7 g/dL    Hematocrit 33.7 (L) 35.0 - 47.0 %    MCV 97 78 - 100 fl    MCH 31.2 26.5 - 33.0 pg    MCHC 32.3 31.5 - 36.5 g/dL    RDW 14.4 10.0 - 15.0 %    Platelet Count 186 150 - 450 10e9/L    Diff Method Automated Method     % Neutrophils 75.4 %    % Lymphocytes 11.6 %    % Monocytes 7.9 %    % Eosinophils 4.0 %    % Basophils 0.1 %    % Immature Granulocytes 1.0 %    Nucleated RBCs 0 0 /100    Absolute Neutrophil 5.3 1.6 - 8.3 10e9/L    Absolute Lymphocytes 0.8 0.8 - 5.3 10e9/L    Absolute Monocytes 0.6 0.0 - 1.3 10e9/L    Absolute Eosinophils 0.3 0.0 - 0.7 10e9/L    Absolute Basophils 0.0 0.0 - 0.2 10e9/L    Abs Immature Granulocytes 0.1 0 - 0.4 10e9/L    Absolute Nucleated RBC 0.0    Lithium level    Collection Time: 04/08/18 11:41 AM   Result Value Ref Range    Lithium Level 0.43 (L) 0.60 - 1.20 mmol/L   Lactic acid level STAT for sepsis protocol     Collection Time: 04/08/18  5:35 PM   Result Value Ref Range    Lactate for Sepsis Protocol 1.6 0.4 - 1.9 mmol/L   Basic metabolic panel    Collection Time: 04/09/18  6:16 AM   Result Value Ref Range    Sodium 140 133 - 144 mmol/L    Potassium 3.5 3.4 - 5.3 mmol/L    Chloride 108 94 - 109 mmol/L    Carbon Dioxide 24 20 - 32 mmol/L    Anion Gap 8 3 - 14 mmol/L    Glucose 91 70 - 99 mg/dL    Urea Nitrogen 4 (L) 7 - 30 mg/dL    Creatinine 0.43 (L) 0.52 - 1.04 mg/dL    GFR Estimate >90 >60 mL/min/1.7m2    GFR Estimate If Black >90 >60 mL/min/1.7m2    Calcium 8.0 (L) 8.5 - 10.1 mg/dL   CBC with platelets    Collection Time: 04/09/18  6:16 AM   Result Value Ref Range    WBC 7.3 4.0 - 11.0 10e9/L    RBC Count 3.32 (L) 3.8 - 5.2 10e12/L    Hemoglobin 10.4 (L) 11.7 - 15.7 g/dL    Hematocrit 32.0 (L) 35.0 - 47.0 %    MCV 96 78 - 100 fl    MCH 31.3 26.5 - 33.0 pg    MCHC 32.5 31.5 - 36.5 g/dL    RDW 14.5 10.0 - 15.0 %    Platelet Count 205 150 - 450 10e9/L   CRP inflammation    Collection Time: 04/09/18  6:16 AM   Result Value Ref Range    CRP Inflammation 69.6 (H) 0.0 - 8.0 mg/L   CBC with platelets    Collection Time: 04/10/18  5:49 AM   Result Value Ref Range    WBC 8.6 4.0 - 11.0 10e9/L    RBC Count 3.41 (L) 3.8 - 5.2 10e12/L    Hemoglobin 10.6 (L) 11.7 - 15.7 g/dL    Hematocrit 32.6 (L) 35.0 - 47.0 %    MCV 96 78 - 100 fl    MCH 31.1 26.5 - 33.0 pg    MCHC 32.5 31.5 - 36.5 g/dL    RDW 14.8 10.0 - 15.0 %    Platelet Count 248 150 - 450 10e9/L   CRP inflammation    Collection Time: 04/10/18  5:49 AM   Result Value Ref Range    CRP Inflammation 84.7 (H) 0.0 - 8.0 mg/L   CRP inflammation    Collection Time: 04/11/18  5:18 AM   Result Value Ref Range    CRP Inflammation 85.4 (H) 0.0 - 8.0 mg/L   CBC with platelets differential    Collection Time: 04/11/18  5:18 AM   Result Value Ref Range    WBC 8.6 4.0 - 11.0 10e9/L    RBC Count 3.37 (L) 3.8 - 5.2 10e12/L    Hemoglobin 10.3 (L) 11.7 - 15.7 g/dL    Hematocrit 32.2 (L) 35.0 -  47.0 %    MCV 96 78 - 100 fl    MCH 30.6 26.5 - 33.0 pg    MCHC 32.0 31.5 - 36.5 g/dL    RDW 14.8 10.0 - 15.0 %    Platelet Count 288 150 - 450 10e9/L    Diff Method Automated Method     % Neutrophils 69.0 %    % Lymphocytes 16.5 %    % Monocytes 7.7 %    % Eosinophils 5.8 %    % Basophils 0.3 %    % Immature Granulocytes 0.7 %    Nucleated RBCs 0 0 /100    Absolute Neutrophil 5.9 1.6 - 8.3 10e9/L    Absolute Lymphocytes 1.4 0.8 - 5.3 10e9/L    Absolute Monocytes 0.7 0.0 - 1.3 10e9/L    Absolute Eosinophils 0.5 0.0 - 0.7 10e9/L    Absolute Basophils 0.0 0.0 - 0.2 10e9/L    Abs Immature Granulocytes 0.1 0 - 0.4 10e9/L    Absolute Nucleated RBC 0.0      No current facility-administered medications for this encounter.     Current Outpatient Prescriptions:      gabapentin (NEURONTIN) 300 MG capsule, Take 1 capsule (300 mg) by mouth 2 times daily, Disp: 60 capsule, Rfl: 3     traZODone (DESYREL) 100 MG tablet, Take 1 tablet (100 mg) by mouth At Bedtime, Disp: 30 tablet, Rfl: 3     lithium (ESKALITH) 450 MG CR tablet, Take 1 tablet (450 mg) by mouth daily, Disp: 30 tablet, Rfl: 3     cholecalciferol 2000 units tablet, Take 2,000 Units by mouth daily, Disp: 90 tablet, Rfl: 3     cloZAPine (CLOZARIL) 50 MG tablet, Take 2 tablets (100 mg) by mouth At Bedtime, Disp: 14 tablet, Rfl: 0

## 2021-07-14 PROBLEM — F31.62 BIPOLAR DISORDER, CURRENT EPISODE MIXED, MODERATE (H): Status: RESOLVED | Noted: 2017-07-19 | Resolved: 2017-07-19

## 2021-07-14 PROBLEM — F31.2 BIPOLAR AFFECTIVE DISORDER, CURRENT EPISODE MANIC WITH PSYCHOTIC SYMPTOMS (H): Status: RESOLVED | Noted: 2017-07-19 | Resolved: 2017-08-07

## 2024-03-05 NOTE — H&P
"Admitted:     03/24/2018      HISTORY OF PRESENT ILLNESS:  Ms. Elma Huntley, date of birth, 1955, is a 62-year-old woman admitted to [facility] unit on 03/24/2018.  She was admitted to the hospital from [facility].      She was admitted due to hearing voices telling her to kill herself.      She has had a course of illness with recurrent spells of hearing voices telling her to kill herself, and in between bouts she is \"normal.\" according to her brother.  She had taken a bottle of trazodone in an overdose attempt in order to die prior to going to [place].      Laboratory work there showed a chemistry profile that was unremarkable, GFR was good, toxicology was unremarkable, CBC was unremarkable except for low hemoglobin and low red blood count, urine toxicology was positive for prescribed drugs.      She had been hospitalized here in 2014 and prescribed lithium.  She was doing well, but demanded the lithium be stopped because she did not like the blood draws.  She still did well after that was stopped taking Abilify, which was decreased. .  She did worse with this and developed more suicidal thinking.  The Abilify was then increased without benefit, and she was then hospitalized at [facility] for 3-1/2 weeks.  Zyprexa was increased to 35 mg per day after it had previously been lowered, she got better and in 01/2018 Zyprexa was decreased again to 30 mg.  She then was found by her brother with a bag over her head and went to [facility].  She was there 8 days, and after discharge became worse again with suicidal thinking and went to [facility] for 6 days.  Haldol was started at that time.  She then had the overdose that led to the stay here.  Therefore, she has been hospitalized in the last 6 months at 4 different hospitals.      When she had been discharged from here, she was taking Zyprexa along with the lithium.  Her current " "admission medications are:   1.  Gabapentin 300 mg twice a day.   2.  Trazodone 100 mg at bedtime.   3.  Zyprexa 20 mg twice a day.   4.  Lexapro 30 mg per day.   5.  Haldol 5 mg twice a day.   6.  Lorazepam 0.5 mg twice a day.      She tells me that she stopped the lithium because it made her not suicidal and she did not like that.  I believe this explanation is fairly suspect, however, based on her current clinical state.      MENTAL STATUS EXAMINATION:  Shows an alert woman sitting up in bed.  She has decreased facial movements and a \"walleyed stare.\"        There is mild cogwheel rigidity.  There is no tremor.  No abnormal mouth movements are noted.  Speech production and spontaneity are decreased and her movements appear somewhat \"waxy.\"      She states that she is hearing several voices telling her to die and that she will die and wants to die and wants to \"just get it over with.\"  She states she has felt this way since mandeep high.  She denies a history of abuse.      Various diagnoses have been given including schizophrenia and when she was here it was major depression with psychotic features.      She is her own legal decision maker, however, she lives with her brother who basically takes care of her and makes decisions for her.      DIAGNOSTIC IMPRESSION:   Major depression, recurrent, severe with psychotic features.      PLAN:  In discussion with the brother and reviewing the medication she has taken, which also include Topamax that cause cognitive problems, the plan is to start Clozaril.  Side effects were discussed with the brother including risk of tardive dyskinesia.      Risk of lowering white blood count was also discussed.      Gabapentin will continue, lorazepam and the antidepressant will be stopped in case the antidepressant is worsening the voices.  I think returning to lithium is an active consideration in her care as well as it seemed to work.         FRANNY LUA MD             D: " 2018   T: 2018   MT: ADA      Name:     GREYSON WATTS   MRN:      [id number]        Account:      [id number]   :      1955        Admitted:     2018                   Document: [id number]       yes...